# Patient Record
Sex: FEMALE | Race: WHITE | Employment: FULL TIME | URBAN - METROPOLITAN AREA
[De-identification: names, ages, dates, MRNs, and addresses within clinical notes are randomized per-mention and may not be internally consistent; named-entity substitution may affect disease eponyms.]

---

## 2023-08-18 ENCOUNTER — OFFICE VISIT (OUTPATIENT)
Age: 28
End: 2023-08-18

## 2023-08-18 VITALS — DIASTOLIC BLOOD PRESSURE: 60 MMHG | WEIGHT: 141 LBS | SYSTOLIC BLOOD PRESSURE: 108 MMHG

## 2023-08-18 DIAGNOSIS — Z31.69 ENCOUNTER FOR PRECONCEPTION CONSULTATION: ICD-10-CM

## 2023-08-18 DIAGNOSIS — N92.6 IRREGULAR PERIODS/MENSTRUAL CYCLES: ICD-10-CM

## 2023-08-18 DIAGNOSIS — Z01.419 WELL WOMAN EXAM: Primary | ICD-10-CM

## 2023-08-18 LAB
EST. AVERAGE GLUCOSE BLD GHB EST-MCNC: 123 MG/DL
FSH SERPL-ACNC: 4.9 MIU/ML
HBA1C MFR BLD: 5.9 % (ref 4–5.6)
LH SERPL-ACNC: 4.5 MIU/ML
PROLACTIN SERPL-MCNC: 4.9 NG/ML
T4 FREE SERPL-MCNC: 0.8 NG/DL (ref 0.8–1.5)
TSH SERPL DL<=0.05 MIU/L-ACNC: 3.36 UIU/ML (ref 0.36–3.74)

## 2023-08-18 RX ORDER — INSULIN GLARGINE 100 [IU]/ML
INJECTION, SOLUTION SUBCUTANEOUS
COMMUNITY
Start: 2023-03-06

## 2023-08-18 RX ORDER — INSULIN LISPRO 100 [IU]/ML
INJECTION, SOLUTION SUBCUTANEOUS
COMMUNITY
Start: 2023-03-06

## 2023-08-18 RX ORDER — INSULIN GLARGINE 100 [IU]/ML
INJECTION, SOLUTION SUBCUTANEOUS
COMMUNITY
Start: 2023-05-15

## 2023-08-18 RX ORDER — INSULIN LISPRO 100 [IU]/ML
INJECTION, SOLUTION INTRAVENOUS; SUBCUTANEOUS
COMMUNITY
Start: 2023-05-15

## 2023-08-18 NOTE — PROGRESS NOTES
Prolactin; Future  - T4, Free; Future  - TSH; Future  - Testosterone, free, total; Future  - 17-OH Progesterone, LC/MS; Future  - FSH & LH; Future  - Hemoglobin A1C  - DHEA-Sulfate  - Prolactin  - T4, Free  - TSH  - Testosterone, free, total  - 17-OH Progesterone, LC/MS  - FSH & LH       Well woman exam:  Normal gynecologic and breast exams. Healthy habits and lifestyle reviewed. Pap with HPV was performed today. Patient declines STD screening. Contraception and menstrual regulation - patient opts for none       Patient will follow up PRN or next annual exam. Will contact pt with labs       Coretta Mishra MD

## 2023-08-19 LAB
DHEA-S SERPL-MCNC: 273 UG/DL (ref 84.8–378)
TESTOST SERPL-MCNC: 27 NG/DL (ref 13–71)

## 2023-08-20 LAB — 17OHP SERPL-MCNC: 24 NG/DL

## 2023-08-23 LAB
., LABCORP: NORMAL
CYTOLOGIST CVX/VAG CYTO: NORMAL
CYTOLOGY CVX/VAG DOC CYTO: NORMAL
CYTOLOGY CVX/VAG DOC THIN PREP: NORMAL
DX ICD CODE: NORMAL
Lab: NORMAL
OTHER STN SPEC: NORMAL
STAT OF ADQ CVX/VAG CYTO-IMP: NORMAL

## 2023-08-27 LAB
TESTOST FREE SERPL-MCNC: 2.5 PG/ML (ref 0–4.2)
TESTOST SERPL-MCNC: 27 NG/DL (ref 13–71)

## 2024-04-22 NOTE — PROGRESS NOTES
FOLLOW UP VISIT    Karissa Wang is a 28 y.o.  presenting for a ultrasound and follow up visit. Her main concerns today include irregular periods.     Chief Complaint   Patient presents with    Ultrasound    Follow-up     Ultrasound today 04/24/2024:  TV ULTRASOUND--- INCREASED BOWEL GAS THE UTERUS IS ANTEVERTED, NORMAL IN SIZE AND ECHOGENICITY. THE ENDOMETRIUM MEASURES 6.4MM IN THICKNESS. THERE APPEARS TO BE INCREASE BLOOD FLOW SEEN WITHIN THEN ENDOMETRIAL LINING. RIGHT OVARY APPEARS WNL. LEFT OVARY APPEARS WNL. NO FREE FLUID IS SEEN IN THE CDS.    From last ov labs 08/18/2023;  FSH 4.9  LH 4.5  17OH Progesterone 24  Testosterone 27  Testosterone Free 2.5  TSH 3.36  T4 Free 0.8  DHEAS 273.0  A1C 5.9       Ob/Gyn Hx:  G0  LMP - Patient's last menstrual period was 02/19/2024.  Menses - irregular   Contraception - none.  Hx of STI - No    SA - Yes      Health Maintenance:  Last Pap: 08/18/2023 NILM    1. Have you been to the ER, urgent care clinic, or hospitalized since your last visit?No    2. Have you seen or consulted any other health care providers outside of the [facility] since your last visit? Endocrinology    Patient declines chaperone.    JH LAGUNAS RN

## 2024-04-24 ENCOUNTER — OFFICE VISIT (OUTPATIENT)
Age: 29
End: 2024-04-24
Payer: COMMERCIAL

## 2024-04-24 VITALS — DIASTOLIC BLOOD PRESSURE: 78 MMHG | WEIGHT: 149.2 LBS | SYSTOLIC BLOOD PRESSURE: 102 MMHG

## 2024-04-24 DIAGNOSIS — N92.6 IRREGULAR PERIODS/MENSTRUAL CYCLES: Primary | ICD-10-CM

## 2024-04-24 LAB
CREAT UR-MCNC: 33.6 MG/DL
MICROALBUMIN UR-MCNC: <0.5 MG/DL
MICROALBUMIN/CREAT UR-RTO: <15 MG/G (ref 0–30)

## 2024-04-24 PROCEDURE — G8427 DOCREV CUR MEDS BY ELIG CLIN: HCPCS | Performed by: OBSTETRICS & GYNECOLOGY

## 2024-04-24 PROCEDURE — 1036F TOBACCO NON-USER: CPT | Performed by: OBSTETRICS & GYNECOLOGY

## 2024-04-24 PROCEDURE — 99214 OFFICE O/P EST MOD 30 MIN: CPT | Performed by: OBSTETRICS & GYNECOLOGY

## 2024-04-24 PROCEDURE — G8421 BMI NOT CALCULATED: HCPCS | Performed by: OBSTETRICS & GYNECOLOGY

## 2024-04-24 NOTE — PROGRESS NOTES
Problem Visit    Karissa Wagn is a 28 y.o. presenting for problem visit. Her main concern today is irregular menstrual cycles. Most recent cycle was February. No cycle in March or April so far. At the end of last year she went about 4 months without a cycle.  This has been going on for years. She is . Type 1 DM was dx her senior year of high school. Follows with Dr. Betts at [facility]. She was worked up for PCOS at her last visit but never had an US until today. PCOS labs at last visit were normal.     Labs 08/18/2023:  FSH 4.9  LH 4.5  17OH Progesterone 24  Testosterone 27  Testosterone Free 2.5  TSH 3.36  T4 Free 0.8  DHEAS 273.0  A1C 5.9      Past Medical History:   Diagnosis Date    Type 1 diabetes mellitus (HCC)        History reviewed. No pertinent surgical history.    Family History   Problem Relation Age of Onset    Breast Cancer Maternal Grandmother        Social History     Socioeconomic History    Marital status:      Spouse name: Not on file    Number of children: Not on file    Years of education: Not on file    Highest education level: Not on file   Occupational History    Not on file   Tobacco Use    Smoking status: Never    Smokeless tobacco: Never   Vaping Use    Vaping Use: Never used   Substance and Sexual Activity    Alcohol use: Yes    Drug use: Never    Sexual activity: Yes     Partners: Male     Birth control/protection: None   Other Topics Concern    Not on file   Social History Narrative    Not on file     Social Determinants of Health     Financial Resource Strain: Not on file   Food Insecurity: Not on file   Transportation Needs: Not on file   Physical Activity: Not on file   Stress: Not on file   Social Connections: Not on file   Intimate Partner Violence: Not on file   Housing Stability: Not on file       Current Outpatient Medications   Medication Sig Dispense Refill    Prenatal MV-Min-Fe Fum-FA-DHA (PRENATAL 1 PO) Take by mouth      insulin lispro (HUMALOG) 100 UNIT/ML

## 2024-04-26 LAB — TSH SERPL DL<=0.05 MIU/L-ACNC: 3.02 UIU/ML (ref 0.45–4.5)

## 2024-11-19 ENCOUNTER — OFFICE VISIT (OUTPATIENT)
Age: 29
End: 2024-11-19
Payer: COMMERCIAL

## 2024-11-19 VITALS
WEIGHT: 157 LBS | OXYGEN SATURATION: 98 % | HEIGHT: 72 IN | BODY MASS INDEX: 21.26 KG/M2 | SYSTOLIC BLOOD PRESSURE: 108 MMHG | DIASTOLIC BLOOD PRESSURE: 64 MMHG

## 2024-11-19 DIAGNOSIS — E10.9 DM W/O COMPLICATION TYPE I (HCC): ICD-10-CM

## 2024-11-19 DIAGNOSIS — N92.6 IRREGULAR PERIODS/MENSTRUAL CYCLES: ICD-10-CM

## 2024-11-19 DIAGNOSIS — Z31.89 ENCOUNTER FOR FERTILITY PLANNING: Primary | ICD-10-CM

## 2024-11-19 PROCEDURE — 3046F HEMOGLOBIN A1C LEVEL >9.0%: CPT | Performed by: OBSTETRICS & GYNECOLOGY

## 2024-11-19 PROCEDURE — 2022F DILAT RTA XM EVC RTNOPTHY: CPT | Performed by: OBSTETRICS & GYNECOLOGY

## 2024-11-19 PROCEDURE — 99214 OFFICE O/P EST MOD 30 MIN: CPT | Performed by: OBSTETRICS & GYNECOLOGY

## 2024-11-19 PROCEDURE — G8420 CALC BMI NORM PARAMETERS: HCPCS | Performed by: OBSTETRICS & GYNECOLOGY

## 2024-11-19 PROCEDURE — G8427 DOCREV CUR MEDS BY ELIG CLIN: HCPCS | Performed by: OBSTETRICS & GYNECOLOGY

## 2024-11-19 PROCEDURE — G8484 FLU IMMUNIZE NO ADMIN: HCPCS | Performed by: OBSTETRICS & GYNECOLOGY

## 2024-11-19 PROCEDURE — 1036F TOBACCO NON-USER: CPT | Performed by: OBSTETRICS & GYNECOLOGY

## 2024-11-19 SDOH — ECONOMIC STABILITY: TRANSPORTATION INSECURITY
IN THE PAST 12 MONTHS, HAS LACK OF TRANSPORTATION KEPT YOU FROM MEETINGS, WORK, OR FROM GETTING THINGS NEEDED FOR DAILY LIVING?: NO

## 2024-11-19 SDOH — ECONOMIC STABILITY: FOOD INSECURITY: WITHIN THE PAST 12 MONTHS, YOU WORRIED THAT YOUR FOOD WOULD RUN OUT BEFORE YOU GOT MONEY TO BUY MORE.: NEVER TRUE

## 2024-11-19 SDOH — ECONOMIC STABILITY: FOOD INSECURITY: WITHIN THE PAST 12 MONTHS, THE FOOD YOU BOUGHT JUST DIDN'T LAST AND YOU DIDN'T HAVE MONEY TO GET MORE.: NEVER TRUE

## 2024-11-19 SDOH — ECONOMIC STABILITY: INCOME INSECURITY: HOW HARD IS IT FOR YOU TO PAY FOR THE VERY BASICS LIKE FOOD, HOUSING, MEDICAL CARE, AND HEATING?: NOT HARD AT ALL

## 2024-11-19 ASSESSMENT — PATIENT HEALTH QUESTIONNAIRE - PHQ9
SUM OF ALL RESPONSES TO PHQ QUESTIONS 1-9: 0
SUM OF ALL RESPONSES TO PHQ9 QUESTIONS 1 & 2: 0
1. LITTLE INTEREST OR PLEASURE IN DOING THINGS: NOT AT ALL
2. FEELING DOWN, DEPRESSED OR HOPELESS: NOT AT ALL
SUM OF ALL RESPONSES TO PHQ QUESTIONS 1-9: 0
2. FEELING DOWN, DEPRESSED OR HOPELESS: NOT AT ALL
SUM OF ALL RESPONSES TO PHQ9 QUESTIONS 1 & 2: 0
SUM OF ALL RESPONSES TO PHQ QUESTIONS 1-9: 0
SUM OF ALL RESPONSES TO PHQ QUESTIONS 1-9: 0
1. LITTLE INTEREST OR PLEASURE IN DOING THINGS: NOT AT ALL

## 2024-11-19 NOTE — PROGRESS NOTES
Karissa Wang is a 29 y.o. female presents for a problem visit.    Patient has been trying to conceive for a few months.    LMP: 9/15/2024  Birth Control: none.  Last Pap: normal obtained 1 year(s) ago.    The patient is reporting having: Irregular cycles. Her last cycle lasted for 2-3 days.      1. Have you been to the ER, urgent care clinic, or hospitalized since your last visit? No

## 2024-11-19 NOTE — PROGRESS NOTES
Problem Visit    Karissa Wang is a 29 y.o. G0 presenting for problem visit. Her main concern today is fertility. She has been trying to conceive from the last few months. Having timed intercourse and using OPK's. Has had evidence of ovulation.  She had type I DM that is well controlled. Menstrual cycles have been irregular. Last cycle was in September. Had a cycle in January and February then went six months without a cycle. Had a month long cycle in August with positive ovulation. September she had a two day cycle. No cycle in October. She had evidence of ovulation this month.       LMP: 9/15/2024  Birth Control: none.  Last Pap: NILM 8/18/24      Past Medical History:   Diagnosis Date    Type 1 diabetes mellitus (HCC)        History reviewed. No pertinent surgical history.    Family History   Problem Relation Age of Onset    Breast Cancer Maternal Grandmother        Social History     Socioeconomic History    Marital status:      Spouse name: Not on file    Number of children: Not on file    Years of education: Not on file    Highest education level: Not on file   Occupational History    Not on file   Tobacco Use    Smoking status: Never    Smokeless tobacco: Never   Vaping Use    Vaping status: Never Used   Substance and Sexual Activity    Alcohol use: Yes     Comment: occasional    Drug use: Never    Sexual activity: Yes     Partners: Male     Birth control/protection: None   Other Topics Concern    Not on file   Social History Narrative    Not on file     Social Determinants of Health     Financial Resource Strain: Not on file   Food Insecurity: Not on file (11/19/2024)   Transportation Needs: Not on file   Physical Activity: Not on file   Stress: Not on file   Social Connections: Not on file   Intimate Partner Violence: Not on file   Housing Stability: Unknown (11/19/2024)    Housing Stability Vital Sign     Unable to Pay for Housing in the Last Year: Not on file     Number of Times Moved in the

## 2024-12-06 DIAGNOSIS — Z34.90 PREGNANCY, UNSPECIFIED GESTATIONAL AGE: Primary | ICD-10-CM

## 2024-12-06 DIAGNOSIS — O24.019 TYPE 1 DIABETES MELLITUS DURING PREGNANCY, ANTEPARTUM: ICD-10-CM

## 2024-12-10 ENCOUNTER — LAB (OUTPATIENT)
Age: 29
End: 2024-12-10

## 2024-12-10 DIAGNOSIS — Z34.90 PREGNANCY, UNSPECIFIED GESTATIONAL AGE: ICD-10-CM

## 2024-12-10 DIAGNOSIS — O24.019 TYPE 1 DIABETES MELLITUS DURING PREGNANCY, ANTEPARTUM: ICD-10-CM

## 2024-12-10 LAB
ABO + RH BLD: NORMAL
BLOOD BANK CMNT PATIENT-IMP: NORMAL
BLOOD GROUP ANTIBODIES SERPL: NORMAL
C. TRACHOMATIS, EXTERNAL RESULT: NORMAL
HEP B, EXTERNAL RESULT: NEGATIVE
HEPATITIS C ANTIBODY, EXTERNAL RESULT: NEGATIVE
HIV, EXTERNAL RESULT: NONREACTIVE
N. GONORRHOEAE, EXTERNAL RESULT: NEGATIVE
RUBELLA TITER, EXTERNAL RESULT: NORMAL
SPECIMEN EXP DATE BLD: NORMAL
T. PALLIDUM (SYPHILIS) ANTIBODY, EXTERNAL RESULT: NEGATIVE

## 2024-12-11 LAB
BACTERIA SPEC CULT: NORMAL
SERVICE CMNT-IMP: NORMAL

## 2024-12-12 DIAGNOSIS — Z32.01 POSITIVE PREGNANCY TEST: Primary | ICD-10-CM

## 2024-12-12 LAB
ALBUMIN SERPL-MCNC: 4 G/DL (ref 3.5–5)
ALBUMIN/GLOB SERPL: 1.4 (ref 1.1–2.2)
ALP SERPL-CCNC: 30 U/L (ref 45–117)
ALT SERPL-CCNC: 26 U/L (ref 12–78)
ANION GAP SERPL CALC-SCNC: 8 MMOL/L (ref 2–12)
AST SERPL-CCNC: 13 U/L (ref 15–37)
BILIRUB SERPL-MCNC: 0.5 MG/DL (ref 0.2–1)
BUN SERPL-MCNC: 14 MG/DL (ref 6–20)
BUN/CREAT SERPL: 19 (ref 12–20)
CALCIUM SERPL-MCNC: 9.2 MG/DL (ref 8.5–10.1)
CHLORIDE SERPL-SCNC: 105 MMOL/L (ref 97–108)
CO2 SERPL-SCNC: 25 MMOL/L (ref 21–32)
CREAT SERPL-MCNC: 0.74 MG/DL (ref 0.55–1.02)
ERYTHROCYTE [DISTWIDTH] IN BLOOD BY AUTOMATED COUNT: 11.8 % (ref 11.5–14.5)
EST. AVERAGE GLUCOSE BLD GHB EST-MCNC: 126 MG/DL
GLOBULIN SER CALC-MCNC: 2.9 G/DL (ref 2–4)
GLUCOSE SERPL-MCNC: 124 MG/DL (ref 65–100)
HBA1C MFR BLD: 6 % (ref 4–5.6)
HBV SURFACE AG SER QL: <0.1 INDEX
HBV SURFACE AG SER QL: NEGATIVE
HCT VFR BLD AUTO: 38 % (ref 35–47)
HCV AB SER IA-ACNC: 0.15 INDEX
HCV AB SERPL QL IA: NONREACTIVE
HGB BLD-MCNC: 12.5 G/DL (ref 11.5–16)
HIV 1+2 AB+HIV1 P24 AG SERPL QL IA: NONREACTIVE
HIV 1/2 RESULT COMMENT: NORMAL
MCH RBC QN AUTO: 30.5 PG (ref 26–34)
MCHC RBC AUTO-ENTMCNC: 32.9 G/DL (ref 30–36.5)
MCV RBC AUTO: 92.7 FL (ref 80–99)
NRBC # BLD: 0 K/UL (ref 0–0.01)
NRBC BLD-RTO: 0 PER 100 WBC
PLATELET # BLD AUTO: 220 K/UL (ref 150–400)
PMV BLD AUTO: 11.5 FL (ref 8.9–12.9)
POTASSIUM SERPL-SCNC: 3.8 MMOL/L (ref 3.5–5.1)
PROT SERPL-MCNC: 6.9 G/DL (ref 6.4–8.2)
RBC # BLD AUTO: 4.1 M/UL (ref 3.8–5.2)
RUBV IGG SERPL IA-ACNC: NORMAL IU/ML
SODIUM SERPL-SCNC: 138 MMOL/L (ref 136–145)
T PALLIDUM AB SER QL IA: NON REACTIVE
VZV IGG SER IA-ACNC: REACTIVE
WBC # BLD AUTO: 7.7 K/UL (ref 3.6–11)

## 2024-12-12 NOTE — PROGRESS NOTES
Patient to be seen in office for viability ultrasound and initial prenatal visit. Ultrasound order pended for signing.

## 2024-12-13 LAB
C TRACH RRNA SPEC QL NAA+PROBE: NEGATIVE
N GONORRHOEA RRNA SPEC QL NAA+PROBE: NEGATIVE
SPECIMEN SOURCE: NORMAL
T VAGINALIS RRNA SPEC QL NAA+PROBE: NEGATIVE
TSH SERPL DL<=0.05 MIU/L-ACNC: 3.59 UIU/ML (ref 0.45–4.5)

## 2024-12-16 LAB
HGB A MFR BLD: 97.5 % (ref 96.4–98.8)
HGB A2 MFR BLD COLUMN CHROM: 2.5 % (ref 1.8–3.2)
HGB F MFR BLD: 0 % (ref 0–2)
HGB FRACT BLD-IMP: NORMAL
HGB S MFR BLD: 0 %

## 2024-12-19 ENCOUNTER — INITIAL PRENATAL (OUTPATIENT)
Age: 29
End: 2024-12-19

## 2024-12-19 VITALS
TEMPERATURE: 98 F | WEIGHT: 153 LBS | HEART RATE: 77 BPM | SYSTOLIC BLOOD PRESSURE: 118 MMHG | HEIGHT: 72 IN | OXYGEN SATURATION: 99 % | DIASTOLIC BLOOD PRESSURE: 79 MMHG | BODY MASS INDEX: 20.72 KG/M2

## 2024-12-19 DIAGNOSIS — Z34.81 ENCOUNTER FOR SUPERVISION OF OTHER NORMAL PREGNANCY, FIRST TRIMESTER: ICD-10-CM

## 2024-12-19 DIAGNOSIS — Z34.90 PREGNANCY, UNSPECIFIED GESTATIONAL AGE: Primary | ICD-10-CM

## 2024-12-19 DIAGNOSIS — Z31.430 ENCOUNTER OF FEMALE FOR TESTING FOR GENETIC DISEASE CARRIER STATUS FOR PROCREATIVE MANAGEMENT: ICD-10-CM

## 2024-12-19 PROCEDURE — 0500F INITIAL PRENATAL CARE VISIT: CPT | Performed by: OBSTETRICS & GYNECOLOGY

## 2024-12-19 RX ORDER — FOLIC ACID 0.4 MG
400 TABLET ORAL DAILY
Qty: 30 TABLET | Refills: 3 | Status: SHIPPED | OUTPATIENT
Start: 2024-12-19

## 2024-12-19 NOTE — PROGRESS NOTES
New pregnancy visit:    Patient's last menstrual period was 09/15/2024 (exact date).     Last Pap: 2023 NILM     LMP history:  The date of her LMP is  certain.  Her last menstrual period was normal and lasted for 4 to 5 days.  She was not on the pill at conception.      Based on her LMP, her EDC is 2025 and her EGA is 13 weeks,4 days. Her menstrual cycles are regular and occur approximately every 28 days and range from 3 to 5 days. The last menses lasted  the usual number of days.      Ultrasound data:  She had an ultrasound done by the ultrasound tech today which revealed a viable cobos pregnancy with a gestational age of 9 weeks and 5 days giving an EDC of 2025.     Pregnancy symptoms:     Since her LMP she has experienced urinary frequency, breast tenderness, and nausea.   She has not been vomiting over the last few weeks.  Associated signs and symptoms which she denies: dysuria, discharge, vaginal bleeding.     She states she has gained weight:  Approximately 5 pounds over the last few weeks.     Relevant past pregnancy history:              She has the following pregnancy history: G1               She has no history of  delivery.     _ _ _ _ _ _ _ _ _ _ _ _ _ _ _ _ _ _ _ _ _ _ _ _ _ _ _ _ _ _ _     Substance history: negative for alcohol, tobacco and street drugs.           Positive for nothing.  Exposure history:   There is/are no indoor cat/s in the home.  The patient was instructed to not change the cat litter.   She admits close contact with children on a regular basis.   She has had chicken pox or the vaccine in the past.   Patient denies issues with domestic violence.     Genetic Screening/Teratology Counseling: (Includes patient, baby's father, or anyone in either family with:)  1.  Patient's age >/= 35 at EDC?--no  2.  Thalassemia (Malawian, Greek, Mediterranean, or  background): MCV<80?--no.     3.  Neural tube defect (meningomyelocele, spina bifida, anencephaly)?--no.

## 2024-12-27 RX ORDER — ASPIRIN 81 MG/1
81 TABLET ORAL DAILY
Qty: 30 TABLET | Refills: 6 | Status: SHIPPED | OUTPATIENT
Start: 2024-12-27

## 2024-12-30 LAB
Lab: NORMAL
NTRA 22Q11.2 DELETION SYNDROME POPULATION-BASED RISK TEXT: NORMAL
NTRA 22Q11.2 DELETION SYNDROME RESULT TEXT: NORMAL
NTRA 22Q11.2 DELETION SYNDROME RISK SCORE TEXT: NORMAL
NTRA FETAL FRACTION: NORMAL
NTRA FETAL RHD SUMMARY: NORMAL
NTRA GENDER OF FETUS: NORMAL
NTRA MONOSOMY X AGE-BASED RISK TEXT: NORMAL
NTRA MONOSOMY X RESULT TEXT: NORMAL
NTRA MONOSOMY X RISK SCORE TEXT: NORMAL
NTRA TRIPLOIDY RESULT TEXT: NORMAL
NTRA TRISOMY 13 AGE-BASED RISK TEXT: NORMAL
NTRA TRISOMY 13 RESULT TEXT: NORMAL
NTRA TRISOMY 13 RISK SCORE TEXT: NORMAL
NTRA TRISOMY 18 AGE-BASED RISK TEXT: NORMAL
NTRA TRISOMY 18 RESULT TEXT: NORMAL
NTRA TRISOMY 18 RISK SCORE TEXT: NORMAL
NTRA TRISOMY 21 AGE-BASED RISK TEXT: NORMAL
NTRA TRISOMY 21 RESULT TEXT: NORMAL
NTRA TRISOMY 21 RISK SCORE TEXT: NORMAL

## 2025-01-06 ENCOUNTER — TELEPHONE (OUTPATIENT)
Age: 30
End: 2025-01-06

## 2025-01-06 LAB
Lab: NEGATIVE
Lab: NORMAL
NTRA ALPHA-THALASSEMIA: NEGATIVE
NTRA BETA-HEMOGLOBINOPATHIES: NEGATIVE
NTRA CANAVAN DISEASE: NEGATIVE
NTRA CYSTIC FIBROSIS: NEGATIVE
NTRA DUCHENNE/BECKER MUSCULAR DYSTROPHY: NEGATIVE
NTRA FAMILIAL DYSAUTONOMIA: NEGATIVE
NTRA FRAGILE X SYNDROME: NEGATIVE
NTRA GALACTOSEMIA: NEGATIVE
NTRA GAUCHER DISEASE: NEGATIVE
NTRA MEDIUM CHAIN ACYL-COA DEHYDROGENASE DEFICIENCY: NEGATIVE
NTRA POLYCYSTIC KIDNEY DISEASE, AUTOSOMAL RECESSIVE: NEGATIVE
NTRA SMITH-LEMLI-OPITZ SYNDROME: NEGATIVE
NTRA SPINAL MUSCULAR ATROPHY: NEGATIVE
NTRA TAY-SACHS DISEASE: NEGATIVE

## 2025-01-06 NOTE — TELEPHONE ENCOUNTER
Spoke w/ patient and confirmed name and      Rescheduled appointment from  to 1/15 at [facility]     Patient confirmed change     Told patient to plan for about an hour scan  Reminded her that she may bring two guest over the age of 12.

## 2025-01-13 SDOH — ECONOMIC STABILITY: INCOME INSECURITY: IN THE LAST 12 MONTHS, WAS THERE A TIME WHEN YOU WERE NOT ABLE TO PAY THE MORTGAGE OR RENT ON TIME?: NO

## 2025-01-13 SDOH — ECONOMIC STABILITY: FOOD INSECURITY: WITHIN THE PAST 12 MONTHS, THE FOOD YOU BOUGHT JUST DIDN'T LAST AND YOU DIDN'T HAVE MONEY TO GET MORE.: NEVER TRUE

## 2025-01-13 SDOH — ECONOMIC STABILITY: TRANSPORTATION INSECURITY
IN THE PAST 12 MONTHS, HAS THE LACK OF TRANSPORTATION KEPT YOU FROM MEDICAL APPOINTMENTS OR FROM GETTING MEDICATIONS?: NO

## 2025-01-13 SDOH — ECONOMIC STABILITY: FOOD INSECURITY: WITHIN THE PAST 12 MONTHS, YOU WORRIED THAT YOUR FOOD WOULD RUN OUT BEFORE YOU GOT MONEY TO BUY MORE.: NEVER TRUE

## 2025-01-15 ENCOUNTER — ROUTINE PRENATAL (OUTPATIENT)
Age: 30
End: 2025-01-15

## 2025-01-15 ENCOUNTER — ROUTINE PRENATAL (OUTPATIENT)
Age: 30
End: 2025-01-15
Payer: COMMERCIAL

## 2025-01-15 VITALS
OXYGEN SATURATION: 97 % | SYSTOLIC BLOOD PRESSURE: 115 MMHG | HEIGHT: 72 IN | HEART RATE: 78 BPM | DIASTOLIC BLOOD PRESSURE: 77 MMHG | TEMPERATURE: 98.1 F | WEIGHT: 161 LBS | RESPIRATION RATE: 16 BRPM | BODY MASS INDEX: 21.81 KG/M2

## 2025-01-15 DIAGNOSIS — O24.319 PREGESTATIONAL DIABETES MELLITUS, MODIFIED WHITE CLASS B: ICD-10-CM

## 2025-01-15 DIAGNOSIS — Z34.90 PREGNANCY, UNSPECIFIED GESTATIONAL AGE: Primary | ICD-10-CM

## 2025-01-15 DIAGNOSIS — Z3A.13 13 WEEKS GESTATION OF PREGNANCY: Primary | ICD-10-CM

## 2025-01-15 PROCEDURE — 1036F TOBACCO NON-USER: CPT | Performed by: STUDENT IN AN ORGANIZED HEALTH CARE EDUCATION/TRAINING PROGRAM

## 2025-01-15 PROCEDURE — 3046F HEMOGLOBIN A1C LEVEL >9.0%: CPT | Performed by: STUDENT IN AN ORGANIZED HEALTH CARE EDUCATION/TRAINING PROGRAM

## 2025-01-15 PROCEDURE — 2022F DILAT RTA XM EVC RTNOPTHY: CPT | Performed by: STUDENT IN AN ORGANIZED HEALTH CARE EDUCATION/TRAINING PROGRAM

## 2025-01-15 PROCEDURE — 76801 OB US < 14 WKS SINGLE FETUS: CPT | Performed by: STUDENT IN AN ORGANIZED HEALTH CARE EDUCATION/TRAINING PROGRAM

## 2025-01-15 PROCEDURE — 99204 OFFICE O/P NEW MOD 45 MIN: CPT | Performed by: STUDENT IN AN ORGANIZED HEALTH CARE EDUCATION/TRAINING PROGRAM

## 2025-01-15 PROCEDURE — 76813 OB US NUCHAL MEAS 1 GEST: CPT | Performed by: STUDENT IN AN ORGANIZED HEALTH CARE EDUCATION/TRAINING PROGRAM

## 2025-01-15 PROCEDURE — G8420 CALC BMI NORM PARAMETERS: HCPCS | Performed by: STUDENT IN AN ORGANIZED HEALTH CARE EDUCATION/TRAINING PROGRAM

## 2025-01-15 PROCEDURE — 0502F SUBSEQUENT PRENATAL CARE: CPT | Performed by: OBSTETRICS & GYNECOLOGY

## 2025-01-15 PROCEDURE — G8427 DOCREV CUR MEDS BY ELIG CLIN: HCPCS | Performed by: STUDENT IN AN ORGANIZED HEALTH CARE EDUCATION/TRAINING PROGRAM

## 2025-01-15 ASSESSMENT — PATIENT HEALTH QUESTIONNAIRE - PHQ9
1. LITTLE INTEREST OR PLEASURE IN DOING THINGS: NOT AT ALL
SUM OF ALL RESPONSES TO PHQ9 QUESTIONS 1 & 2: 0
SUM OF ALL RESPONSES TO PHQ QUESTIONS 1-9: 0
2. FEELING DOWN, DEPRESSED OR HOPELESS: NOT AT ALL

## 2025-01-15 NOTE — PROCEDURES
PATIENT: EVA MALLORY.   -  : 1995   -  DOS:01/15/2025   -  INTERPRETING PROVIDER:Sadaf Eagle,   Indication  ========    Type 1 DM (on insulin)    Method  ======    Transabdominal ultrasound examination, View: Good view    Pregnancy  =========    Shetty pregnancy. Number of fetuses: 1    Dating  ======    Previous Ultrasound on: 2024  Type of prior assessment: GA  GA at prior assessment date 9 w + 5 d  GA by previous U/S 13 w + 4 d  JONG by previous Ultrasound: 2025  Ultrasound examination on: 1/15/2025  GA by U/S based upon: CRL  GA by U/S 13 w + 5 d  JONG by U/S: 2025  Assigned: based on ultrasound (GA), selected on 01/15/2025  Assigned GA 13 w + 4 d  Assigned JONG: 2025    General Evaluation  ==============    Cardiac activity present  Placenta: posterior, fundal  Amniotic fluid: normal amount, normal amount    Fetal Biometry  ============    Standard   bpm  <1% Nicolaides  CRL 76.2 mm 13w 5d 56% Hadlock  NT 1.60 mm  Extended  Nasal bone 3.2 mm  Nasal bone: present  MVP 4.8 cm    Fetal Anatomy  ===========    The following structures appear normal:  Stomach. Bladder.    The following structures were visualized:  Cranium: Midline falx  Choroid plexus, Midline falx  Choroid plexus. Face: Profile, Profile. Abdominal wall: Intact, . Arms. Legs.    Maternal Structures  ===============    Ovaries / Tubes / Adnexa  Rt ovary: Not visualized  Rt ovary details: right adnexa appears normal  Lt ovary: Not visualized  Lt ovary details: left adnexa appears normal    Findings  =======    Intrauterine Shetty pregnancy at 13w 4d by clinical dates.  Cardiac activity is present.  Due to early gestation, limited anatomy visualized is stated above.  Right ovary is Not visualized.  Left ovary is Not visualized.    The ultrasound findings as listed above and diagnostic limitations of ultrasound imaging, including inability to exclude all anomalies, have been reviewed with the patient.

## 2025-01-15 NOTE — PROGRESS NOTES
Patient was seen 1/15/2025      Please look under media to view full consult and ultrasound report in ViewPoint.         Sadaf Eagle MD   Maternal Fetal Medicine

## 2025-01-15 NOTE — PROGRESS NOTES
Karissa Wang is 13w4d   Doing well  Denies LOF, bleeding/spotting, cramping, headache, blurred vision, edema, or RUQ pain.     Saw endocrine and MFM today!

## 2025-01-15 NOTE — PROGRESS NOTES
No chief complaint on file.       LMP 09/15/2024 (Exact Date)     Pain Scale: /10  Pain Location:      Current Outpatient Medications on File Prior to Visit   Medication Sig Dispense Refill    aspirin (ASPIRIN 81) 81 MG EC tablet Take 1 tablet by mouth daily Start at 12 weeks 30 tablet 6    folic acid (V-R FOLIC ACID) 400 MCG tablet Take 1 tablet by mouth daily 30 tablet 3    Prenatal MV-Min-Fe Fum-FA-DHA (PRENATAL 1 PO) Take by mouth      insulin lispro (HUMALOG) 100 UNIT/ML SOLN injection vial 5 Units      insulin glargine (LANTUS SOLOSTAR) 100 UNIT/ML injection pen INJECT UP TO 10 UNITS ONCE DAILY      blood glucose test strips (ASCENSIA AUTODISC VI;ONE TOUCH ULTRA TEST VI) strip Use as directed to check blood sugar four times a day. Either Accu-chek Kacie or One Touch Ultra per pt preference       No current facility-administered medications on file prior to visit.       \"Have you been to the ER, urgent care clinic since your last visit?  Hospitalized since your last visit?\"    NO    “Have you seen or consulted any other health care providers outside of [facility] since your last visit?”    YES - When: approximately   ago.  Where and Why: [facility].

## 2025-01-15 NOTE — PROGRESS NOTES
Patient here for return OB visit at 13w4d. She reports no concerns.  Discussed blood sugars in detail.  She is following with her endocrinologist virtually through [facility].  Blood sugars are being reviewed weekly through endocrinology.  She is on maternal-fetal medicine today.  Dr. Eagle.    She denies vaginal bleeding, loss of fluid, abnormal vaginal discharge, UTI symptoms, cramping, or contractions.       /77 (Site: Left Upper Arm, Position: Sitting)   Pulse 78   Temp 98.1 °F (36.7 °C) (Oral)   Resp 16   Ht 1.829 m (6')   Wt 73 kg (161 lb)   LMP 09/15/2024 (Exact Date)   SpO2 97%   BMI 21.84 kg/m²          Prenatal Fetal Information  Fetal HR: 145  Movement: Absent      Patient Active Problem List    Diagnosis Date Noted    Pregestational diabetes mellitus, modified White class B 01/15/2025    13 weeks gestation of pregnancy 01/15/2025       Return in about 4 weeks (around 2/12/2025).    Coretta Wilks MD

## 2025-02-11 NOTE — PROGRESS NOTES
Patient here for return OB visit at 17w4d.    MSAFP today.    Discussed blood sugars in detail. Feeling down and a little bummed. Reassurance given. Weight discussed. Is not moving quiet as much as she was previously.     She denies vaginal bleeding, loss of fluid, abnormal vaginal discharge, UTI symptoms, cramping, or contractions.       /80 (Site: Right Upper Arm, Position: Sitting)   Pulse 88   Temp 98 °F (36.7 °C) (Oral)   Resp 16   Ht 1.829 m (6')   Wt 80.7 kg (178 lb)   LMP 09/15/2024 (Exact Date)   SpO2 99%   BMI 24.14 kg/m²      Patient Active Problem List    Diagnosis Date Noted    Pregnancy 2025     Primary Provider: Efe     EDC by: by 1st TM US not consistent with LMP  Social:  Mode   IOB labs: Hep B neg.Hep C neg. RPR neg. HIV neg. Varicella/Rubella immune O POSITIVE  . Normal hemoglobin electrophoresis.    Genetic Screening:Panoroma low risk  Horizon    Anatomy: with MFM on 3/5/25  3rd TM labs: GTT NA Type I DM  Hgb___ Plts___  Vaccines:   Flu:__ TDAP:__ RSV ___ 32-36 weeks September-January   Rhogam: O POSITIVE     GBS:    Pain management in labor :   -Epidural  -Lamaze   -Hydrotherapy        Postpartum  -Breast/Bottle   -Pap: 23 NILM      Problem List:  1) Type I DM  -A1C 6.0  -does not have a CGM  -follows with endocrinology at [facility] (Dr Betts)   -Lanus 18 units   - lispro ranges from 6-7 units per meal           Pregestational diabetes mellitus, modified White class B 01/15/2025       Return in about 4 weeks (around 3/12/2025).    Coretta Wilks MD

## 2025-02-12 ENCOUNTER — ROUTINE PRENATAL (OUTPATIENT)
Age: 30
End: 2025-02-12

## 2025-02-12 VITALS
WEIGHT: 178 LBS | TEMPERATURE: 98 F | OXYGEN SATURATION: 99 % | HEIGHT: 72 IN | HEART RATE: 88 BPM | DIASTOLIC BLOOD PRESSURE: 80 MMHG | BODY MASS INDEX: 24.11 KG/M2 | SYSTOLIC BLOOD PRESSURE: 125 MMHG | RESPIRATION RATE: 16 BRPM

## 2025-02-12 DIAGNOSIS — Z34.90 PREGNANCY, UNSPECIFIED GESTATIONAL AGE: Primary | ICD-10-CM

## 2025-02-12 PROBLEM — Z3A.13 13 WEEKS GESTATION OF PREGNANCY: Status: RESOLVED | Noted: 2025-01-15 | Resolved: 2025-02-12

## 2025-02-12 LAB
EST. AVERAGE GLUCOSE BLD GHB EST-MCNC: 94 MG/DL
HBA1C MFR BLD: 4.9 % (ref 4–5.6)
T4 FREE SERPL-MCNC: 0.8 NG/DL (ref 0.8–1.5)
TSH SERPL DL<=0.05 MIU/L-ACNC: 2.5 UIU/ML (ref 0.36–3.74)

## 2025-02-12 PROCEDURE — 0502F SUBSEQUENT PRENATAL CARE: CPT | Performed by: OBSTETRICS & GYNECOLOGY

## 2025-02-12 NOTE — PROGRESS NOTES
Karissa Wang is 17w4d   Doing well  Denies LOF, bleeding/spotting, cramping, headache, blurred vision, edema, or RUQ pain.     Reports increased fatigue    Desires MSAFP

## 2025-02-15 LAB
AFP INTERP SERPL-IMP: NORMAL
AFP MOM SERPL: 0.93
AFP SERPL-MCNC: 44.8 NG/ML
AGE AT DELIVERY: 29 YR
AGE OF EGG DONOR: NORMAL
AGE OF EGG DONOR: NORMAL
COMMENT: NORMAL
DONOR EGG?: NO
DONOR EGG?: NORMAL
FAMILY HISTORY NTD: NORMAL
FHX NTD (Y OR N): NORMAL
GA METHOD: NORMAL
GA: 21.4 WEEKS
IDDM PATIENT QL: YES
INSULIN DEP. DIABETIC: NORMAL
Lab: 178
Lab: NORMAL
Lab: NORMAL
MAT SCN FOR FETAL ABNORMALITIES SERPL: NORMAL
MULTIPLE PREGNANCY: NO
NEURAL TUBE DEFECT RISK FETUS: 4778
NUMBER OF FETUSES: YES
OTHER INDICATIONS: NO
OTHER INDICATIONS: NORMAL
PREVIOUSLY ELEVATED AFP (Y OR N): 17
PREVIOUSLY ELEVATED AFP (Y OR N): NO
PRIOR 1ST TRIM TESTING ?: NO
PRIOR 2ND TRIM TESTING ?: NO
PRIOR DS/NTD SCREEN CURRENT PREGNANCY?: NO
PRIOR DS/NTD SCREEN CURRENT PREGNANCY?: NORMAL
PRIOR FIRST TRIMESTER TESTING (Y OR N ): NORMAL
PRIOR PREGNANCY WITH DOWN SYNDROME (Y OR N): 1
PRIOR PREGNANCY WITH DOWN SYNDROME (Y OR N): NO
TYPE OF EGG DONOR: NORMAL
TYPE OF EGG DONOR: NORMAL

## 2025-02-26 ENCOUNTER — TELEPHONE (OUTPATIENT)
Age: 30
End: 2025-02-26

## 2025-03-05 DIAGNOSIS — Z34.90 PREGNANCY, UNSPECIFIED GESTATIONAL AGE: Primary | ICD-10-CM

## 2025-03-06 ENCOUNTER — ROUTINE PRENATAL (OUTPATIENT)
Age: 30
End: 2025-03-06

## 2025-03-06 VITALS — DIASTOLIC BLOOD PRESSURE: 70 MMHG | HEART RATE: 74 BPM | SYSTOLIC BLOOD PRESSURE: 120 MMHG

## 2025-03-06 DIAGNOSIS — Z34.90 PREGNANCY, UNSPECIFIED GESTATIONAL AGE: ICD-10-CM

## 2025-03-06 NOTE — PROCEDURES
PATIENT: EVA MALLORY.   -  : 1995   -  DOS:2025   -  INTERPRETING PROVIDER:Brodie Bach,   Indication  ========    Type 1 DM (on insulin)    Method  ======    Transabdominal ultrasound examination. View: suboptimal due to unfavorable fetal position    Dating  ======    Previous Ultrasound on: 2024  Type of prior assessment: GA  GA at prior assessment date 9 w + 5 d  GA by previous U/S 20 w + 5 d  JONG by previous Ultrasound: 2025  Ultrasound examination on: 3/6/2025  GA by U/S based upon: AC, BPD, Femur, HC  GA by U/S 20 w + 6 d  JONG by U/S: 2025  Assigned: based on ultrasound (GA), selected on 01/15/2025  Assigned GA 20 w + 5 d  Assigned JONG: 2025    Fetal Growth Overview  =================    Exam date        GA              BPD (mm)          HC (mm)              AC (mm)              FL (mm)             HL (mm)             EFW (g)  2025          20w 5d        50.5    72%        178.2     23%        158.3    52%        33.6     35%        32.5    56%        374     46%    Fetal Biometry  ============    Standard  BPD 50.5 mm 21w 2d 72% Hadlock  OFD 61.3 mm 21w 1d 63% Akiko  .2 mm 20w 2d 23% Hadlock  Cerebellum tr 20.5 mm 19w 4d 27% Hill  Nuchal fold 3.8 mm  .3 mm 21w 0d 52% Hadlock  Femur 33.6 mm 20w 4d 35% Hadlock  Humerus 32.5 mm 21w 0d 56% Akiko   g 20w 4d 46% Hadlock  EFW (lb) 0 lb  EFW (oz) 13 oz  EFW by: Hadlock (BPD-HC-AC-FL)  Extended   6.1 mm  CM 4.9 mm  41% Nicolaides  Head / Face / Neck  Nasal bone: NOT VISUALIZED  Other Structures   bpm    General Evaluation  ==============    Cardiac activity present.  bpm. Fetal movements: visualized. Presentation: Cephalic  Placenta: Placental site: posterior, appropriate distance from the internal os. Placental edge-to-cervical os distance 8.7 cm  Umbilical cord: Cord vessels: 3 vessel cord. Insertion site: central  Amniotic fluid: Amount of AF: normal    Fetal

## 2025-03-11 ENCOUNTER — CLINICAL DOCUMENTATION (OUTPATIENT)
Age: 30
End: 2025-03-11

## 2025-03-11 ENCOUNTER — NURSE ONLY (OUTPATIENT)
Age: 30
End: 2025-03-11

## 2025-03-14 ENCOUNTER — ROUTINE PRENATAL (OUTPATIENT)
Age: 30
End: 2025-03-14

## 2025-03-14 VITALS
SYSTOLIC BLOOD PRESSURE: 109 MMHG | DIASTOLIC BLOOD PRESSURE: 62 MMHG | WEIGHT: 179 LBS | RESPIRATION RATE: 16 BRPM | TEMPERATURE: 97.5 F | OXYGEN SATURATION: 98 % | HEART RATE: 71 BPM | HEIGHT: 72 IN | BODY MASS INDEX: 24.24 KG/M2

## 2025-03-14 DIAGNOSIS — Z34.90 PREGNANCY, UNSPECIFIED GESTATIONAL AGE: Primary | ICD-10-CM

## 2025-03-14 PROCEDURE — 0502F SUBSEQUENT PRENATAL CARE: CPT | Performed by: OBSTETRICS & GYNECOLOGY

## 2025-03-14 NOTE — PROGRESS NOTES
Karissa Wang is 21w6d   Doing well  Unsure if she has felt fetal movement yet   Denies LOF, bleeding/spotting, cramping, headache, blurred vision, edema, or RUQ pain.

## 2025-03-14 NOTE — PROGRESS NOTES
Patient here for return OB visit at 21w6d.   Discussed blood sugars in detail.   Discussed possibility of NPH.      She some fetal movement.   She denies vaginal bleeding, loss of fluid, abnormal vaginal discharge, UTI symptoms, cramping, or contractions.       /62 (BP Site: Right Upper Arm, Patient Position: Sitting)   Pulse 71   Temp 97.5 °F (36.4 °C) (Oral)   Resp 16   Ht 1.829 m (6')   Wt 81.2 kg (179 lb)   LMP 09/15/2024 (Exact Date)   SpO2 98%   BMI 24.28 kg/m²          Patient Active Problem List    Diagnosis Date Noted    Pregnancy 2025     Primary Provider: Efe     EDC by: by 1st TM US not consistent with LMP  Social:  Mode   IOB labs: Hep B neg.Hep C neg. RPR neg. HIV neg. Varicella/Rubella immune O POSITIVE  . Normal hemoglobin electrophoresis.    Genetic Screening:Panoroma low risk  Horizon    Anatomy: with MFM on 3/5/25  3rd TM labs: GTT NA Type I DM  Hgb___ Plts___  Vaccines:   Flu:__ TDAP:__ RSV ___ 32-36 weeks September-January   Rhogam: O POSITIVE     GBS:    Pain management in labor :   -Epidural  -Lamaze   -Hydrotherapy        Postpartum  -Breast/Bottle   -Pap: 23 NILM      Problem List:  1) Type I DM  -A1C 6.0  -does not have a CGM  -follows with endocrinology at [facility] (Dr Betts)   -Lanus 18 units   - lispro ranges from 6-7 units per meal           Pregestational diabetes mellitus, modified White class B 01/15/2025       Return in about 4 weeks (around 2025).    Coretta Wilks MD

## 2025-04-03 ENCOUNTER — ROUTINE PRENATAL (OUTPATIENT)
Age: 30
End: 2025-04-03

## 2025-04-03 VITALS — DIASTOLIC BLOOD PRESSURE: 69 MMHG | SYSTOLIC BLOOD PRESSURE: 107 MMHG | HEART RATE: 84 BPM

## 2025-04-03 DIAGNOSIS — Z34.90 PREGNANCY, UNSPECIFIED GESTATIONAL AGE: ICD-10-CM

## 2025-04-03 DIAGNOSIS — O26.90 PREGNANCY COMPLICATION, ANTEPARTUM: Primary | ICD-10-CM

## 2025-04-03 NOTE — PROCEDURES
PATIENT: EVA MALLORY.   -  : 1995   -  DOS:2025   -  INTERPRETING PROVIDER:Sadaf Eagle,   Indication  ========    Type 1 DM (on insulin)    Method  ======    Transabdominal ultrasound examination. View: Sufficient    Pregnancy  =========    Shetty pregnancy. Number of fetuses: 1    Dating  ======    Previous Ultrasound on: 2024  Type of prior assessment: GA  GA at prior assessment date 9 w + 5 d  GA by previous U/S 24 w + 5 d  JONG by previous Ultrasound: 2025  Ultrasound examination on: 4/3/2025  GA by U/S based upon: AC, BPD, Femur, HC  GA by U/S 25 w + 2 d  JONG by U/S: 7/15/2025  Assigned: based on ultrasound (GA), selected on 01/15/2025  Assigned GA 24 w + 5 d  Assigned JONG: 2025    Fetal Biometry  ============    Standard  BPD 63.3 mm 25w 4d 75% Hadlock  OFD 80.7 mm 26w 2d 92% Akiko  .5 mm 25w 0d 39% Hadlock  .7 mm 25w 2d 61% Hadlock  Femur 45.2 mm 25w 0d 44% Hadlock   g 25w 0d 61% Hadlock  EFW (lb) 1 lb  EFW (oz) 11 oz  EFW by: Hadlock (BPD-HC-AC-FL)  Extended  Nasal bone 7.1 mm  Head / Face / Neck  Nasal bone: present  Other Structures   bpm    General Evaluation  ==============    Cardiac activity present.  bpm. Fetal movements: visualized. Presentation: cephalic  Placenta: Placental site: posterior, appropriate distance from the internal os  Umbilical cord: Cord vessels: 3 vessel cord. Insertion site: central  Amniotic fluid: Amount of AF: normal. MVP 4.2 cm. GREGORIO 13.3 cm. Q1 4.2 cm, Q2 3.5 cm, Q3 2.6 cm, Q4 2.9 cm    Fetal Anatomy  ===========    Cavum septi pellucidi: normal  Lips: normal  Profile: normal  Nose: normal  Face  Coronal face: normal  Nasal bone: present  Palate: normal  Maxilla: normal  Mandible: normal  Orbits: normal  Stomach: normal  Kidneys: normal  Bladder: normal  Wants to know fetal sex: yes    Findings  =======    Intrauterine Shetty pregnancy at 24w 5d by clinical dates.  EFW is 779 g at 61%, abdominal

## 2025-04-03 NOTE — PROGRESS NOTES
Patient was seen 4/3/2025      Please look under media to view full consult and ultrasound report in ViewPoint.         Sadaf Eagle MD   Maternal Fetal Medicine

## 2025-04-15 ENCOUNTER — ROUTINE PRENATAL (OUTPATIENT)
Age: 30
End: 2025-04-15

## 2025-04-15 VITALS
BODY MASS INDEX: 25.06 KG/M2 | DIASTOLIC BLOOD PRESSURE: 68 MMHG | OXYGEN SATURATION: 98 % | HEIGHT: 72 IN | TEMPERATURE: 98.2 F | WEIGHT: 185 LBS | SYSTOLIC BLOOD PRESSURE: 120 MMHG | HEART RATE: 87 BPM | RESPIRATION RATE: 16 BRPM

## 2025-04-15 DIAGNOSIS — Z34.90 PREGNANCY, UNSPECIFIED GESTATIONAL AGE: Primary | ICD-10-CM

## 2025-04-15 LAB
BLOOD BANK CMNT PATIENT-IMP: NORMAL
BLOOD GROUP ANTIBODIES SERPL: NORMAL

## 2025-04-15 PROCEDURE — 0502F SUBSEQUENT PRENATAL CARE: CPT | Performed by: OBSTETRICS & GYNECOLOGY

## 2025-04-15 RX ORDER — FERROUS SULFATE 325(65) MG
325 TABLET, DELAYED RELEASE (ENTERIC COATED) ORAL
Qty: 30 TABLET | Refills: 11 | Status: SHIPPED | OUTPATIENT
Start: 2025-04-15 | End: 2025-04-15 | Stop reason: ALTCHOICE

## 2025-04-15 NOTE — PROGRESS NOTES
Karissa MOULTON Wang is 26w3d   Doing well  +FM  Denies LOF, bleeding/spotting, cramping, headache, blurred vision, edema, or RUQ pain.

## 2025-04-15 NOTE — PROGRESS NOTES
Patient here for return OB visit at 26w3d.       She reports good fetal movement.   She denies vaginal bleeding, loss of fluid, abnormal vaginal discharge, UTI symptoms, cramping, or contractions.       /68 (BP Site: Right Upper Arm, Patient Position: Sitting)   Pulse 87   Temp 98.2 °F (36.8 °C) (Oral)   Resp 16   Ht 1.829 m (6')   Wt 83.9 kg (185 lb)   LMP 09/15/2024 (Exact Date)   SpO2 98%   BMI 25.09 kg/m²        Prenatal Fetal Information  Fetal HR: 145  Movement: Present      Patient Active Problem List    Diagnosis Date Noted    Pregnancy 2025     Primary Provider: Efe     EDC by: by 1st TM US not consistent with LMP  Social:  Mode   IOB labs: Hep B neg.Hep C neg. RPR neg. HIV neg. Varicella/Rubella immune O POSITIVE  . Normal hemoglobin electrophoresis.    Genetic Screening:Panoroma low risk  Horizon    Anatomy: with MFM on 3/5/25  3rd TM labs: GTT NA Type I DM  Hgb___ Plts___  Vaccines:   Flu:__ TDAP:__ RSV ___ 32-36 weeks September-January   Rhogam: O POSITIVE     GBS:    Pain management in labor :   -Epidural  -Lamaze   -Hydrotherapy        Postpartum  -Breast/Bottle   -Pap: 23 NILM      Problem List:  1) Type I DM  -A1C 6.0  -does not have a CGM  -follows with endocrinology at [facility] (Dr Betts)   -Lanus 26 units   - lispro ranges from 8-9 units per meal   -had eye exam at the beginning of pregnancy   -repeating A1c 4/15/25           Pregestational diabetes mellitus, modified White class B 01/15/2025       Return for 30 weeks, 32, 34, 36, 37, 38, 39 .    Coretta Wilks MD

## 2025-04-16 ENCOUNTER — RESULTS FOLLOW-UP (OUTPATIENT)
Age: 30
End: 2025-04-16

## 2025-04-16 LAB
ERYTHROCYTE [DISTWIDTH] IN BLOOD BY AUTOMATED COUNT: 12.1 % (ref 11.5–14.5)
EST. AVERAGE GLUCOSE BLD GHB EST-MCNC: 97 MG/DL
FERRITIN SERPL-MCNC: 10 NG/ML (ref 8–252)
HBA1C MFR BLD: 5 % (ref 4–5.6)
HCT VFR BLD AUTO: 36.2 % (ref 35–47)
HGB BLD-MCNC: 11.5 G/DL (ref 11.5–16)
MCH RBC QN AUTO: 30.2 PG (ref 26–34)
MCHC RBC AUTO-ENTMCNC: 31.8 G/DL (ref 30–36.5)
MCV RBC AUTO: 95 FL (ref 80–99)
NRBC # BLD: 0 K/UL (ref 0–0.01)
NRBC BLD-RTO: 0 PER 100 WBC
PLATELET # BLD AUTO: 231 K/UL (ref 150–400)
PMV BLD AUTO: 11.9 FL (ref 8.9–12.9)
RBC # BLD AUTO: 3.81 M/UL (ref 3.8–5.2)
WBC # BLD AUTO: 10.2 K/UL (ref 3.6–11)

## 2025-04-16 RX ORDER — FERROUS SULFATE 325(65) MG
325 TABLET, DELAYED RELEASE (ENTERIC COATED) ORAL
Qty: 30 TABLET | Refills: 11 | Status: SHIPPED | OUTPATIENT
Start: 2025-04-16

## 2025-04-17 LAB — T PALLIDUM AB SER QL IA: NON REACTIVE

## 2025-05-05 ENCOUNTER — ROUTINE PRENATAL (OUTPATIENT)
Age: 30
End: 2025-05-05
Payer: COMMERCIAL

## 2025-05-05 VITALS — DIASTOLIC BLOOD PRESSURE: 76 MMHG | SYSTOLIC BLOOD PRESSURE: 126 MMHG | HEART RATE: 90 BPM

## 2025-05-05 DIAGNOSIS — Z34.90 PREGNANCY, UNSPECIFIED GESTATIONAL AGE: Primary | ICD-10-CM

## 2025-05-05 PROCEDURE — G8427 DOCREV CUR MEDS BY ELIG CLIN: HCPCS | Performed by: OBSTETRICS & GYNECOLOGY

## 2025-05-05 PROCEDURE — 1036F TOBACCO NON-USER: CPT | Performed by: OBSTETRICS & GYNECOLOGY

## 2025-05-05 PROCEDURE — 99213 OFFICE O/P EST LOW 20 MIN: CPT | Performed by: OBSTETRICS & GYNECOLOGY

## 2025-05-05 PROCEDURE — G8419 CALC BMI OUT NRM PARAM NOF/U: HCPCS | Performed by: OBSTETRICS & GYNECOLOGY

## 2025-05-05 PROCEDURE — 76816 OB US FOLLOW-UP PER FETUS: CPT | Performed by: OBSTETRICS & GYNECOLOGY

## 2025-05-05 NOTE — PROCEDURES
PATIENT: EVA MALLORY.   -  : 1995   -  DOS:2025   -  INTERPRETING PROVIDER:Jeremie Mcdermott,   Indication  ========    Type 1 DM (on insulin)    Method  ======    Transabdominal ultrasound examination. View: Good view    Pregnancy  =========    Shetty pregnancy. Number of fetuses: 1    Dating  ======    Previous Ultrasound on: 2024  Type of prior assessment: GA  GA at prior assessment date 9 w + 5 d  GA by previous U/S 29 w + 2 d  JONG by previous Ultrasound: 2025  Ultrasound examination on: 2025  GA by U/S based upon: AC, BPD, Femur, HC  GA by U/S 29 w + 5 d  JONG by U/S: 2025  Assigned: based on ultrasound (GA), selected on 01/15/2025  Assigned GA 29 w + 2 d  Assigned JONG: 2025    Fetal Biometry  ============    Standard  BPD 72.2 mm 29w 0d 28% Hadlock  OFD 96.8 mm 31w 2d 92% Akiko  .2 mm 29w 2d 19% Hadlock  Cerebellum tr 33.8 mm 28w 4d 30% Hill  .9 mm 31w 6d 97% Hadlock  Femur 54.6 mm 28w 6d 23% Hadlock  EFW 1,574 g 30w 1d 78% Hadlock  EFW (lb) 3 lb  EFW (oz) 8 oz  EFW by: Hadlock (BPD-HC-AC-FL)  Extended   3.4 mm  Other Structures   bpm    General Evaluation  ==============    Cardiac activity present.  bpm. Fetal movements: visualized. Presentation: BREECH  Placenta: Placental site: posterior, appropriate distance from the internal os  Umbilical cord: Cord vessels: 3 vessel cord. Insertion site: central  Amniotic fluid: Amount of AF: normal. MVP 7.7 cm. GREGORIO 23.2 cm. Q1 3.7 cm, Q2 7.7 cm, Q3 7.2 cm, Q4 4.6 cm    Fetal Anatomy  ===========    Stomach: normal  Kidneys: normal  Bladder: normal  Wants to know fetal sex: yes    Findings  =======    Intrauterine Shetty pregnancy at 29w 2d by clinical dates.  EFW is 1574 g at 78%, abdominal circumference at 97%.  Amniotic fluid: normal.  Placenta is posterior, appropriate distance from the internal os.  BREECH presentation.    The ultrasound findings as listed above and diagnostic limitations

## 2025-05-06 ENCOUNTER — ROUTINE PRENATAL (OUTPATIENT)
Age: 30
End: 2025-05-06
Payer: COMMERCIAL

## 2025-05-06 VITALS
RESPIRATION RATE: 16 BRPM | WEIGHT: 184 LBS | TEMPERATURE: 98.1 F | HEIGHT: 72 IN | HEART RATE: 94 BPM | BODY MASS INDEX: 24.92 KG/M2 | OXYGEN SATURATION: 98 % | SYSTOLIC BLOOD PRESSURE: 128 MMHG | DIASTOLIC BLOOD PRESSURE: 61 MMHG

## 2025-05-06 DIAGNOSIS — Z34.90 PREGNANCY, UNSPECIFIED GESTATIONAL AGE: Primary | ICD-10-CM

## 2025-05-06 PROCEDURE — 90471 IMMUNIZATION ADMIN: CPT | Performed by: OBSTETRICS & GYNECOLOGY

## 2025-05-06 PROCEDURE — 0502F SUBSEQUENT PRENATAL CARE: CPT | Performed by: OBSTETRICS & GYNECOLOGY

## 2025-05-06 PROCEDURE — 90715 TDAP VACCINE 7 YRS/> IM: CPT | Performed by: OBSTETRICS & GYNECOLOGY

## 2025-05-06 NOTE — PROGRESS NOTES
Patient here for return OB visit at 29w3d.       She reports good fetal movement.   She denies vaginal bleeding, loss of fluid, abnormal vaginal discharge, UTI symptoms, cramping, or contractions.       /61 (BP Site: Left Upper Arm, Patient Position: Sitting)   Pulse 94   Temp 98.1 °F (36.7 °C) (Oral)   Resp 16   Ht 1.829 m (6')   Wt 83.5 kg (184 lb)   LMP 09/15/2024 (Exact Date)   SpO2 98%   BMI 24.95 kg/m²        Prenatal Fetal Information  Fetal HR: 140  Movement: Present      Patient Active Problem List    Diagnosis Date Noted    Pregnancy 2025     Primary Provider: Efe     EDC by: by 1st TM US not consistent with LMP  Social:  Mode   IOB labs: Hep B neg.Hep C neg. RPR neg. HIV neg. Varicella/Rubella immune O POSITIVE  . Normal hemoglobin electrophoresis.    Genetic Screening:Panoroma low risk  Horizon    Anatomy: with MFM on 3/5/25  3rd TM labs: GTT NA Type I DM  Hgb___ Plts___  Vaccines:   Flu:__ TDAP: 25 RSV ___ 32-36 weeks September-January   Rhogam: O POSITIVE     GBS:    Pain management in labor :   -Epidural  -Lamaze   -Hydrotherapy        Postpartum  -Breast/Bottle   -Pap: 23 NILM      Problem List:  1) Type I DM  -A1C 6.0  -does not have a CGM  -follows with endocrinology at [facility] (Dr Betts)   -Lanus 26 units   - lispro ranges from 8-9 units per meal   -had eye exam at the beginning of pregnancy   -repeating A1c 4/15/25           Pregestational diabetes mellitus, modified White class B 01/15/2025       No follow-ups on file.    Coretta Wilks MD

## 2025-05-06 NOTE — PROGRESS NOTES
Karissa Wang is 29w3d   Doing well  +FM  Denies LOF, bleeding/spotting, cramping, headache, blurred vision, edema, or RUQ pain.     Verbal order obtained from Coretta Wilks MD for Tdap vaccine and a diagnosis of pregnancy. Order read back to MD. Orders signed by this writer and sent for co-sign to MD.

## 2025-05-13 ENCOUNTER — ROUTINE PRENATAL (OUTPATIENT)
Age: 30
End: 2025-05-13

## 2025-05-13 VITALS
OXYGEN SATURATION: 98 % | BODY MASS INDEX: 24.95 KG/M2 | SYSTOLIC BLOOD PRESSURE: 121 MMHG | DIASTOLIC BLOOD PRESSURE: 81 MMHG | WEIGHT: 184 LBS | HEART RATE: 75 BPM

## 2025-05-13 DIAGNOSIS — O36.8190 DECREASED FETAL MOVEMENT DURING PREGNANCY, ANTEPARTUM, SINGLE OR UNSPECIFIED FETUS: Primary | ICD-10-CM

## 2025-05-13 DIAGNOSIS — Z34.90 PREGNANCY, UNSPECIFIED GESTATIONAL AGE: Primary | ICD-10-CM

## 2025-05-13 PROCEDURE — 0502F SUBSEQUENT PRENATAL CARE: CPT | Performed by: OBSTETRICS & GYNECOLOGY

## 2025-05-13 NOTE — PROGRESS NOTES
Patient here for a problem OB visit at 30w3d.     Kick less intense today.   BPP 8/8  Reassurance given.     She denies vaginal bleeding, loss of fluid, abnormal vaginal discharge, UTI symptoms, cramping, or contractions.       /81   Pulse 75   LMP 09/15/2024 (Exact Date)          Patient Active Problem List    Diagnosis Date Noted    Pregnancy 2025     Primary Provider: Efe     EDC by: by 1st TM US not consistent with LMP  Social:  Mode   IOB labs: Hep B neg.Hep C neg. RPR neg. HIV neg. Varicella/Rubella immune O POSITIVE  . Normal hemoglobin electrophoresis.    Genetic Screening:Panoroma low risk  Horizon    Anatomy: with MFM on 3/5/25  3rd TM labs: GTT NA Type I DM  Hgb___ Plts___  Vaccines:   Flu:__ TDAP: 25 RSV ___ 32-36 weeks September-January   Rhogam: O POSITIVE     GBS:    Pain management in labor :   -Epidural  -Lamaze   -Hydrotherapy        Postpartum  -Breast/Bottle   -Pap: 23 NILM      Problem List:  1) Type I DM  -A1C 6.0  -does not have a CGM  -follows with endocrinology at [facility] (Dr Betts)   -Lanus 26 units   - lispro ranges from 8-9 units per meal   -had eye exam at the beginning of pregnancy   -repeating A1c 4/15/25           Pregestational diabetes mellitus, modified White class B 01/15/2025       Return in about 1 week (around 2025).    Coretta Wilks MD

## 2025-05-13 NOTE — PROGRESS NOTES
Pt added on today for changes in FM  She reports babies kicks are not as strong.  Denies lof, vb, or pain.  BPP today

## 2025-05-20 ENCOUNTER — ROUTINE PRENATAL (OUTPATIENT)
Age: 30
End: 2025-05-20

## 2025-05-20 ENCOUNTER — RESULTS FOLLOW-UP (OUTPATIENT)
Age: 30
End: 2025-05-20

## 2025-05-20 VITALS
HEIGHT: 72 IN | BODY MASS INDEX: 25.19 KG/M2 | DIASTOLIC BLOOD PRESSURE: 88 MMHG | TEMPERATURE: 98.4 F | RESPIRATION RATE: 16 BRPM | SYSTOLIC BLOOD PRESSURE: 126 MMHG | WEIGHT: 186 LBS | HEART RATE: 79 BPM | OXYGEN SATURATION: 96 %

## 2025-05-20 DIAGNOSIS — Z34.90 PREGNANCY, UNSPECIFIED GESTATIONAL AGE: Primary | ICD-10-CM

## 2025-05-20 LAB
T4 FREE SERPL-MCNC: 0.9 NG/DL (ref 0.8–1.5)
TSH SERPL DL<=0.05 MIU/L-ACNC: 2.24 UIU/ML (ref 0.36–3.74)

## 2025-05-20 PROCEDURE — 0502F SUBSEQUENT PRENATAL CARE: CPT | Performed by: OBSTETRICS & GYNECOLOGY

## 2025-05-20 RX ORDER — RESPIRATORY SYNCYTIAL VIRUS VACCINE 120MCG/0.5
0.5 KIT INTRAMUSCULAR ONCE
Qty: 0.5 ML | Refills: 0 | Status: SHIPPED | OUTPATIENT
Start: 2025-05-20 | End: 2025-05-20

## 2025-05-20 NOTE — PROGRESS NOTES
Patient here for return OB visit at 31w3d.       She reports good fetal movement.   She denies vaginal bleeding, loss of fluid, abnormal vaginal discharge, UTI symptoms, cramping, or contractions.       /88 (BP Site: Left Upper Arm, Patient Position: Sitting)   Pulse 79   Temp 98.4 °F (36.9 °C) (Oral)   Resp 16   Ht 1.829 m (6')   Wt 84.4 kg (186 lb)   LMP 09/15/2024 (Exact Date)   SpO2 96%   BMI 25.23 kg/m²        Prenatal Fetal Information  Fetal HR: 145  Movement: Present      Patient Active Problem List    Diagnosis Date Noted    Pregnancy 2025     Primary Provider: Efe     EDC by: by 1st TM US not consistent with LMP  Social:  Mode   IOB labs: Hep B neg.Hep C neg. RPR neg. HIV neg. Varicella/Rubella immune O POSITIVE  . Normal hemoglobin electrophoresis.    Genetic Screening:Panoroma low risk  Horizon    Anatomy: with MFM on 3/5/25  3rd TM labs: GTT NA Type I DM  Hgb___ Plts___  Vaccines:   Flu:__ TDAP: 25 RSV ___ 32-36 weeks September-January   Rhogam: O POSITIVE     GBS:    Pain management in labor :   -Epidural  -Lamaze   -Hydrotherapy        Postpartum  -Breast/Bottle   -Pap: 23 NILM      Problem List:  1) Type I DM  -A1C 6.0  -does not have a CGM  -follows with endocrinology at [facility] (Dr Betts)   -Lanus 26 units   - lispro ranges from 8-9 units per meal   -had eye exam at the beginning of pregnancy   -repeating A1c 4/15/25           Pregestational diabetes mellitus, modified White class B 01/15/2025       Return in about 2 weeks (around 6/3/2025).    Coretta Wilks MD

## 2025-05-20 NOTE — PROGRESS NOTES
Karissa SAIGE Wang is 31w3d   Doing well  +FM  Denies LOF, bleeding/spotting, cramping, headache, blurred vision, edema, or RUQ pain.

## 2025-06-02 ENCOUNTER — ROUTINE PRENATAL (OUTPATIENT)
Age: 30
End: 2025-06-02
Payer: COMMERCIAL

## 2025-06-02 VITALS — HEART RATE: 87 BPM | DIASTOLIC BLOOD PRESSURE: 70 MMHG | SYSTOLIC BLOOD PRESSURE: 107 MMHG

## 2025-06-02 DIAGNOSIS — Z34.90 PREGNANCY, UNSPECIFIED GESTATIONAL AGE: ICD-10-CM

## 2025-06-02 PROCEDURE — 99214 OFFICE O/P EST MOD 30 MIN: CPT | Performed by: STUDENT IN AN ORGANIZED HEALTH CARE EDUCATION/TRAINING PROGRAM

## 2025-06-02 PROCEDURE — G8419 CALC BMI OUT NRM PARAM NOF/U: HCPCS | Performed by: STUDENT IN AN ORGANIZED HEALTH CARE EDUCATION/TRAINING PROGRAM

## 2025-06-02 PROCEDURE — G8427 DOCREV CUR MEDS BY ELIG CLIN: HCPCS | Performed by: STUDENT IN AN ORGANIZED HEALTH CARE EDUCATION/TRAINING PROGRAM

## 2025-06-02 PROCEDURE — 1036F TOBACCO NON-USER: CPT | Performed by: STUDENT IN AN ORGANIZED HEALTH CARE EDUCATION/TRAINING PROGRAM

## 2025-06-02 PROCEDURE — 59025 FETAL NON-STRESS TEST: CPT | Performed by: STUDENT IN AN ORGANIZED HEALTH CARE EDUCATION/TRAINING PROGRAM

## 2025-06-02 PROCEDURE — 76816 OB US FOLLOW-UP PER FETUS: CPT | Performed by: STUDENT IN AN ORGANIZED HEALTH CARE EDUCATION/TRAINING PROGRAM

## 2025-06-02 NOTE — PROCEDURES
PATIENT: EVA MALLORY.   -  : 1995   -  DOS:2025   -  INTERPRETING PROVIDER:Sadaf Eagle,   Indication  ========    Type 1 DM (on insulin)    Method  ======    Transabdominal ultrasound examination and external fetal monitor. View: Sufficient    Pregnancy  =========    Shetty pregnancy. Number of fetuses: 1    Dating  ======    Previous Ultrasound on: 2024  Type of prior assessment: GA  GA at prior assessment date 9 w + 5 d  GA by previous U/S 33 w + 2 d  JONG by previous Ultrasound: 2025  Ultrasound examination on: 2025  GA by U/S based upon: AC, BPD, Femur, HC  GA by U/S 33 w + 2 d  JONG by U/S: 2025  Assigned: based on ultrasound (GA), selected on 01/15/2025  Assigned GA 33 w + 2 d  Assigned JONG: 2025    Fetal Biometry  ============    Standard  BPD 82.4 mm 33w 1d 40% Hadlock  .4 mm 35w 1d 84% Akiko  .9 mm 34w 0d 32% Hadlock  .7 mm 33w 4d 60% Hadlock  Femur 62.3 mm 32w 2d 15% Hadlock  EFW 2,141 g 32w 6d 39% Hadlock  EFW (lb) 4 lb  EFW (oz) 12 oz  EFW by: Hadlock (BPD-HC-AC-FL)  Other Structures   bpm    General Evaluation  ==============    Cardiac activity present.  bpm. Fetal movements: visualized. Presentation: Cephalic  Placenta: Placental site: posterior, appropriate distance from the internal os  Umbilical cord: Cord vessels: 3 vessel cord. Insertion site: central  Amniotic fluid: Amount of AF: normal. MVP 5.8 cm. GREGORIO 18.1 cm. Q1 5.8 cm, Q2 3.7 cm, Q3 4.0 cm, Q4 4.7 cm    Fetal Anatomy  ===========    Stomach: normal  Kidneys: normal  Bladder: normal  Wants to know fetal sex: yes    Non Stress Test  =============    NST interpretation: reactive. Test duration 20 min. Baseline  bpm. Baseline variability: moderate. Accelerations: present. Decelerations: absent. Uterine activity:  absent    Findings  =======    Intrauterine Shetty pregnancy at 33w 2d by clinical dates.  EFW is 2141 g at 39%, abdominal circumference at

## 2025-06-02 NOTE — PROGRESS NOTES
Patient was seen 6/2/2025      Please look under media to view full consult and ultrasound report in ViewPoint.         Sadaf Eagle MD   Maternal Fetal Medicine

## 2025-06-03 ENCOUNTER — RESULTS FOLLOW-UP (OUTPATIENT)
Age: 30
End: 2025-06-03

## 2025-06-03 ENCOUNTER — ROUTINE PRENATAL (OUTPATIENT)
Age: 30
End: 2025-06-03

## 2025-06-03 VITALS
BODY MASS INDEX: 25.76 KG/M2 | DIASTOLIC BLOOD PRESSURE: 73 MMHG | OXYGEN SATURATION: 96 % | SYSTOLIC BLOOD PRESSURE: 111 MMHG | HEIGHT: 72 IN | RESPIRATION RATE: 16 BRPM | WEIGHT: 190.2 LBS | HEART RATE: 81 BPM | TEMPERATURE: 97.5 F

## 2025-06-03 DIAGNOSIS — Z34.90 PREGNANCY, UNSPECIFIED GESTATIONAL AGE: Primary | ICD-10-CM

## 2025-06-03 LAB
CREAT UR-MCNC: 24.9 MG/DL
ERYTHROCYTE [DISTWIDTH] IN BLOOD BY AUTOMATED COUNT: 13.3 % (ref 11.5–14.5)
FERRITIN SERPL-MCNC: 24 NG/ML (ref 26–388)
HCT VFR BLD AUTO: 37.8 % (ref 35–47)
HGB BLD-MCNC: 11.9 G/DL (ref 11.5–16)
LDH SERPL L TO P-CCNC: 138 U/L (ref 81–246)
MCH RBC QN AUTO: 30.6 PG (ref 26–34)
MCHC RBC AUTO-ENTMCNC: 31.5 G/DL (ref 30–36.5)
MCV RBC AUTO: 97.2 FL (ref 80–99)
NRBC # BLD: 0 K/UL (ref 0–0.01)
NRBC BLD-RTO: 0 PER 100 WBC
PLATELET # BLD AUTO: 192 K/UL (ref 150–400)
PMV BLD AUTO: 12.3 FL (ref 8.9–12.9)
PROT UR-MCNC: 13 MG/DL (ref 0–11.9)
PROT/CREAT UR-RTO: 0.5
RBC # BLD AUTO: 3.89 M/UL (ref 3.8–5.2)
WBC # BLD AUTO: 9.9 K/UL (ref 3.6–11)

## 2025-06-03 PROCEDURE — 0502F SUBSEQUENT PRENATAL CARE: CPT | Performed by: OBSTETRICS & GYNECOLOGY

## 2025-06-03 NOTE — PROGRESS NOTES
Patient here for return OB visit at 33w3d.     Blood sugar are well controlled.       She reports good fetal movement.   She denies vaginal bleeding, loss of fluid, abnormal vaginal discharge, UTI symptoms, cramping, or contractions.       /73 (BP Site: Left Upper Arm, Patient Position: Sitting)   Pulse 81   Temp 97.5 °F (36.4 °C) (Oral)   Resp 16   Ht 1.829 m (6')   Wt 86.3 kg (190 lb 3.2 oz)   LMP 09/15/2024 (Exact Date)   SpO2 96%   BMI 25.80 kg/m²        Prenatal Fetal Information  Fetal HR: 145  Movement: Present      Patient Active Problem List    Diagnosis Date Noted    Pregnancy 2025     Primary Provider: Efe     EDC by: by 1st TM US not consistent with LMP  Social:  Mode   IOB labs: Hep B neg.Hep C neg. RPR neg. HIV neg. Varicella/Rubella immune O POSITIVE  . Normal hemoglobin electrophoresis.    Genetic Screening:Panoroma low risk  Horizon    Anatomy: with MFM on 3/5/25  3rd TM labs: GTT NA Type I DM  Hgb___ Plts___  Vaccines:   Flu:__ TDAP: 25 RSV ___ 32-36 weeks September-January   Rhogam: O POSITIVE     GBS:    Pain management in labor :   -Epidural  -Lamaze   -Hydrotherapy        Postpartum  -Breast/Bottle   -Pap: 23 NILM      Problem List:  1) Type I DM  -A1C 6.0  -does not have a CGM  -follows with endocrinology at [facility] (Dr Betts)   -Lanus 26 units   - lispro ranges from 8-9 units per meal   -had eye exam at the beginning of pregnancy   -repeating A1c 4/15/25           Pregestational diabetes mellitus, modified White class B 01/15/2025       No follow-ups on file.    Coretta Wilks MD

## 2025-06-03 NOTE — PROGRESS NOTES
Karissa SAIGE Wang is 33w3d   Doing well  +FM  Denies LOF, bleeding/spotting, cramping, headache, blurred vision, edema, or RUQ pain.

## 2025-06-06 ENCOUNTER — TELEPHONE (OUTPATIENT)
Age: 30
End: 2025-06-06

## 2025-06-09 ENCOUNTER — ROUTINE PRENATAL (OUTPATIENT)
Age: 30
End: 2025-06-09
Payer: COMMERCIAL

## 2025-06-09 ENCOUNTER — CLINICAL SUPPORT (OUTPATIENT)
Age: 30
End: 2025-06-09

## 2025-06-09 ENCOUNTER — RESULTS FOLLOW-UP (OUTPATIENT)
Age: 30
End: 2025-06-09

## 2025-06-09 VITALS — SYSTOLIC BLOOD PRESSURE: 110 MMHG | DIASTOLIC BLOOD PRESSURE: 72 MMHG | HEART RATE: 93 BPM

## 2025-06-09 DIAGNOSIS — Z34.90 PREGNANCY, UNSPECIFIED GESTATIONAL AGE: Primary | ICD-10-CM

## 2025-06-09 DIAGNOSIS — Z34.90 PREGNANCY, UNSPECIFIED GESTATIONAL AGE: ICD-10-CM

## 2025-06-09 LAB
COLLECT DURATION TIME UR: 24 HR
PROT 24H UR-MRATE: 400 MG/24HR
SPECIMEN VOL ?TM UR: 4000 ML

## 2025-06-09 PROCEDURE — 59025 FETAL NON-STRESS TEST: CPT | Performed by: STUDENT IN AN ORGANIZED HEALTH CARE EDUCATION/TRAINING PROGRAM

## 2025-06-09 PROCEDURE — 99213 OFFICE O/P EST LOW 20 MIN: CPT | Performed by: STUDENT IN AN ORGANIZED HEALTH CARE EDUCATION/TRAINING PROGRAM

## 2025-06-09 PROCEDURE — 76815 OB US LIMITED FETUS(S): CPT | Performed by: STUDENT IN AN ORGANIZED HEALTH CARE EDUCATION/TRAINING PROGRAM

## 2025-06-09 NOTE — PROCEDURES
PATIENT: EVA MALLORY.   -  : 1995   -  DOS:2025   -  INTERPRETING PROVIDER:Sadaf Eagle,   Indication  ========    Type 1 DM (on insulin)    Method  ======    External Fetal Monitor and transabdominal ultrasound examination, View: Sufficient    Pregnancy  =========    Shetty pregnancy. Number of fetuses: 1    Dating  ======    Previous Ultrasound on: 2024  Type of prior assessment: GA  GA at prior assessment date 9 w + 5 d  GA by previous U/S 34 w + 2 d  JONG by previous Ultrasound: 2025  Assigned: based on ultrasound (GA), selected on 01/15/2025  Assigned GA 34 w + 2 d  Assigned JONG: 2025    General Evaluation  ==============    Cardiac activity present.  bpm. Fetal movements: visualized, visualized. Presentation: Cephalic  Placenta: Placental site: posterior, appropriate distance from the internal os  Umbilical cord: Cord vessels: 3 vessel cord    Fetal Anatomy  ===========    Stomach: normal  Kidneys: normal  Bladder: normal  Wants to know fetal sex: yes    Amniotic Fluid Assessment  =====================    Amount of AF: normal  MVP 5.6 cm. GREGORIO 17.2 cm. Q1 4.5 cm, Q2 3.7 cm, Q3 3.4 cm, Q4 5.6 cm    Non Stress Test  =============    NST interpretation: reactive. Test duration 20 min. Baseline  bpm. Baseline variability: moderate. Accelerations: present. Decelerations: absent. Uterine activity:  present, patient comfortable    Findings  =======    Intrauterine Shetty pregnancy at 34w 2d by clinical dates.  Amniotic fluid: normal.  Placenta is posterior, appropriate distance from the internal os.  Cephalic presentation.    NST is reactive. Modified BPP is normal.    The ultrasound findings as listed above and diagnostic limitations of ultrasound imaging, including inability to exclude all anomalies, have been reviewed with the patient. All  questions and concerns addressed. Intrauterine    Consultation  ==========    ANUP is 30 yrs of age, G1 at 34w

## 2025-06-09 NOTE — PROGRESS NOTES
Verbal order obtained from Coretta Wilks MD for 24 hr urine and a diagnosis of pregnancy. Order read back to MD. Orders signed by this writer and sent for co-sign to MD.

## 2025-06-09 NOTE — PROGRESS NOTES
Patient was seen 6/9/2025      Please look under media to view full consult and ultrasound report in ViewPoint.         Sadaf Eagle MD   Maternal Fetal Medicine

## 2025-06-16 ENCOUNTER — ROUTINE PRENATAL (OUTPATIENT)
Age: 30
End: 2025-06-16
Payer: COMMERCIAL

## 2025-06-16 VITALS — HEART RATE: 84 BPM | DIASTOLIC BLOOD PRESSURE: 71 MMHG | SYSTOLIC BLOOD PRESSURE: 104 MMHG

## 2025-06-16 DIAGNOSIS — Z34.90 PREGNANCY, UNSPECIFIED GESTATIONAL AGE: Primary | ICD-10-CM

## 2025-06-16 PROCEDURE — 99213 OFFICE O/P EST LOW 20 MIN: CPT | Performed by: OBSTETRICS & GYNECOLOGY

## 2025-06-16 PROCEDURE — G8419 CALC BMI OUT NRM PARAM NOF/U: HCPCS | Performed by: OBSTETRICS & GYNECOLOGY

## 2025-06-16 PROCEDURE — G8427 DOCREV CUR MEDS BY ELIG CLIN: HCPCS | Performed by: OBSTETRICS & GYNECOLOGY

## 2025-06-16 PROCEDURE — 1036F TOBACCO NON-USER: CPT | Performed by: OBSTETRICS & GYNECOLOGY

## 2025-06-16 PROCEDURE — 76818 FETAL BIOPHYS PROFILE W/NST: CPT | Performed by: OBSTETRICS & GYNECOLOGY

## 2025-06-16 NOTE — PROGRESS NOTES
Patient was seen 6/16/2025      Please look under media to view full consult and ultrasound report in ViewPoint.         Jeremie Mcdermott MD  Maternal Fetal Medicine

## 2025-06-16 NOTE — PROCEDURES
PATIENT: EVA MALLORY.   -  : 1995   -  DOS:2025   -  INTERPRETING PROVIDER:Jeremie Mcdermott,   Indication  ========    Type 1 DM (on insulin)    Method  ======    External Fetal Monitor and transabdominal ultrasound examination. View: Good view    Pregnancy  =========    Shetty pregnancy. Number of fetuses: 1    Dating  ======    Previous Ultrasound on: 2024  Type of prior assessment: GA  GA at prior assessment date 9 w + 5 d  GA by previous U/S 35 w + 2 d  JONG by previous Ultrasound: 2025  Assigned: based on ultrasound (GA), selected on 01/15/2025  Assigned GA 35 w + 2 d  Assigned JONG: 2025    General Evaluation  ==============    Cardiac activity present.  bpm. Fetal movements: visualized. Presentation: Cephalic  Placenta: Placental site: posterior, appropriate distance from the internal os  Umbilical cord: Cord vessels: 3 vessel cord    Fetal Anatomy  ===========    Stomach: normal  Kidneys: normal  Bladder: normal  Wants to know fetal sex: yes    Amniotic Fluid Assessment  =====================    Amount of AF: normal  MVP 5.3 cm. GREGORIO 16.4 cm. Q1 3.4 cm, Q2 3.2 cm, Q3 4.6 cm, Q4 5.3 cm    Biophysical Profile  ==============    2: Fetal breathing movements  2: Gross body movements  2: Fetal tone  2: Amniotic fluid volume  NST: non-reactive  8/10 Biophysical profile score    Non Stress Test  =============    NST interpretation: non-reactive. Test duration 20 min. Baseline  bpm. Baseline variability: moderate. Accelerations: present. Decelerations: absent. Uterine  activity: absent    Findings  =======    Intrauterine Shetty pregnancy at 35w 2d by clinical dates.  Amniotic fluid: normal.  Placenta is posterior, appropriate distance from the internal os.  Cephalic presentation.  Biophysical profile score is 8/10.  NST is non-reactive.  The ultrasound findings as listed above and diagnostic limitations of ultrasound imaging, including inability to exclude all

## 2025-06-17 ENCOUNTER — ROUTINE PRENATAL (OUTPATIENT)
Age: 30
End: 2025-06-17

## 2025-06-17 VITALS
HEART RATE: 65 BPM | TEMPERATURE: 98.4 F | HEIGHT: 72 IN | RESPIRATION RATE: 16 BRPM | DIASTOLIC BLOOD PRESSURE: 86 MMHG | BODY MASS INDEX: 26.14 KG/M2 | OXYGEN SATURATION: 98 % | SYSTOLIC BLOOD PRESSURE: 126 MMHG | WEIGHT: 193 LBS

## 2025-06-17 DIAGNOSIS — Z34.90 PREGNANCY, UNSPECIFIED GESTATIONAL AGE: Primary | ICD-10-CM

## 2025-06-17 PROCEDURE — 0502F SUBSEQUENT PRENATAL CARE: CPT | Performed by: OBSTETRICS & GYNECOLOGY

## 2025-06-17 NOTE — PROGRESS NOTES
Karissa GNiki Kathleen is 35w3d   Doing well  +FM  Denies LOF, bleeding/spotting, cramping, headache, blurred vision, edema, or RUQ pain.

## 2025-06-17 NOTE — PROGRESS NOTES
Patient here for return OB visit at 35w3d.       She reports good fetal movement.   She denies vaginal bleeding, loss of fluid, abnormal vaginal discharge, UTI symptoms, cramping, or contractions.       /86 (BP Site: Left Upper Arm, Patient Position: Sitting)   Pulse 65   Temp 98.4 °F (36.9 °C) (Oral)   Resp 16   Ht 1.829 m (6')   Wt 87.5 kg (193 lb)   LMP 09/15/2024 (Exact Date)   SpO2 98%   BMI 26.18 kg/m²        Prenatal Fetal Information  Fetal HR: 135  Movement: Present      Patient Active Problem List    Diagnosis Date Noted    Pregnancy 2025     Primary Provider: Efe     EDC by: by 1st TM US not consistent with LMP  Social:  Mode   IOB labs: Hep B neg.Hep C neg. RPR neg. HIV neg. Varicella/Rubella immune O POSITIVE  . Normal hemoglobin electrophoresis.    Genetic Screening:Panoroma low risk  Horizon    Anatomy: with MFM on 3/5/25  3rd TM labs: GTT NA Type I DM  Hgb___ Plts___  Vaccines:   Flu:__ TDAP: 25 RSV ___ 32-36 weeks September-January   Rhogam: O POSITIVE     GBS:    Pain management in labor :   -Epidural  -Lamaze   -Hydrotherapy        Postpartum  -Breast/Bottle   -Pap: 23 NILM      Problem List:  1) Type I DM  -A1C 6.0  -does not have a CGM  -follows with endocrinology at [facility] (Dr Betts)   -Lanus 26 units   - lispro ranges from 8-9 units per meal   -had eye exam at the beginning of pregnancy   -repeating A1c 4/15/25           Pregestational diabetes mellitus, modified White class B 01/15/2025       Return in about 1 week (around 2025).    Coretta Wilks MD

## 2025-06-23 ENCOUNTER — ROUTINE PRENATAL (OUTPATIENT)
Age: 30
End: 2025-06-23
Payer: COMMERCIAL

## 2025-06-23 VITALS — HEART RATE: 84 BPM | DIASTOLIC BLOOD PRESSURE: 82 MMHG | SYSTOLIC BLOOD PRESSURE: 133 MMHG

## 2025-06-23 DIAGNOSIS — O24.319 PREGESTATIONAL DIABETES MELLITUS, MODIFIED WHITE CLASS B: Primary | ICD-10-CM

## 2025-06-23 DIAGNOSIS — Z34.90 PREGNANCY, UNSPECIFIED GESTATIONAL AGE: ICD-10-CM

## 2025-06-23 PROCEDURE — 59025 FETAL NON-STRESS TEST: CPT | Performed by: OBSTETRICS & GYNECOLOGY

## 2025-06-23 PROCEDURE — 76815 OB US LIMITED FETUS(S): CPT | Performed by: OBSTETRICS & GYNECOLOGY

## 2025-06-23 PROCEDURE — G8419 CALC BMI OUT NRM PARAM NOF/U: HCPCS

## 2025-06-23 PROCEDURE — 1036F TOBACCO NON-USER: CPT

## 2025-06-23 PROCEDURE — 2022F DILAT RTA XM EVC RTNOPTHY: CPT

## 2025-06-23 PROCEDURE — 3044F HG A1C LEVEL LT 7.0%: CPT

## 2025-06-23 PROCEDURE — G8428 CUR MEDS NOT DOCUMENT: HCPCS

## 2025-06-23 PROCEDURE — 99212 OFFICE O/P EST SF 10 MIN: CPT

## 2025-06-23 NOTE — PROGRESS NOTES
Provided verbal communication regarding Non-Stress Test (NST), fetal movement counts, labor precautions, and signs and symptoms of pre-eclampsia. Patient verbalized understanding of the information provided. All patient questions were answered.

## 2025-06-23 NOTE — PROGRESS NOTES
Patient was seen 6/23/2025      Please look under media to view full consult and ultrasound report in ViewPoint.         Jeremie Mcdermott MD  Maternal Fetal Medicine

## 2025-06-23 NOTE — PROGRESS NOTES
Assessment & Plan   ASSESSMENT/PLAN:  1. Pregestational diabetes mellitus, modified White class B    KARISSA is 30 yrs of age, G1 at 36w 2d.      Reactive NST, normal GREGORIO today.      Pregestational DM:   - Patient was diagnosed with Type 1DM approximately 10 years ago. She has not had DKA. She does not have a CGM and would like to avoid one if possible and is followed closely with Dr. Betts at [facility] endocrinology. Neither a pump or CGM desired.  - She has been sending her log to endocrine and monitors very closely.   - A1C 5.0 on 4/15; TSH/Free T4 normal;   - Continue ldASA for preE prophylaxis   - Recommend baseline preE labs: CBC CMP normal; baseline PC ratio 0.5 on 6/3, 24hr urine 400  - BPs normotensive. Denies headaches, visual changes, right upper quadrant or epigastric pain.   - Ophthalmology consult to rule out retinopathy -- normal in December  - msAFP wnl  - fetal echocardiography - normal on 3/18 and .   - Diabetes managed by endocrine   - Current regimen: Lantus 35 units daily, Humalog CHO ratio 1:9-11  - Kick count instructions, PIH and PTL precautions reviewed.      Negative carrier screening and low risk NIPT     2025 1134 I have reviewed the ultrasound images from today. I have reviewed and approved the NP care/treatment plan.  Jeremie Mcdermott MD  Maternal/Fetal Medicine     Please see Viewpoint for ultrasound findings.       Subjective   Karissa Wang (:  1995) is a 30 y.o. female,Established patient, here for evaluation of the following chief complaint(s):  1. Pregestational diabetes mellitus, modified White class B    Objective   Physical Exam  Vitals reviewed.   Constitutional:       Appearance: Normal appearance.   Neurological:      Mental Status: She is alert.   Psychiatric:         Mood and Affect: Mood normal.         Judgment: Judgment normal.       On this date 2025  I have spent time reviewing previous notes, test results and discussing the diagnosis and

## 2025-06-23 NOTE — PROCEDURES
PATIENT: EVA MALLORY.   -  : 1995   -  DOS:2025   -  INTERPRETING PROVIDER:Jeremie Mcdermott,   Indication  ========    Type 1 DM (on insulin)    Method  ======    External Fetal Monitor and transabdominal ultrasound examination. View: Good view    Pregnancy  =========    Shetty pregnancy. Number of fetuses: 1    Dating  ======    Previous Ultrasound on: 2024  Type of prior assessment: GA  GA at prior assessment date 9 w + 5 d  GA by previous U/S 36 w + 2 d  JONG by previous Ultrasound: 2025  Assigned: based on ultrasound (GA), selected on 01/15/2025  Assigned GA 36 w + 2 d  Assigned JONG: 2025    General Evaluation  ==============    Cardiac activity present.  bpm. Fetal movements: visualized. Presentation: Cephalic  Placenta: Placental site: posterior, appropriate distance from the internal os  Umbilical cord: Cord vessels: 3 vessel cord    Fetal Anatomy  ===========    Stomach: normal  Kidneys: normal  Bladder: normal  Wants to know fetal sex: yes    Amniotic Fluid Assessment  =====================    Amount of AF: normal  MVP 5.5 cm. GREGORIO 13.3 cm. Q1 2.1 cm, Q2 3.8 cm, Q3 2.0 cm, Q4 5.5 cm    Non Stress Test  =============    NST interpretation: reactive. Test duration 20 min. Baseline  bpm. Baseline variability: moderate. Accelerations: present. Decelerations: absent. Uterine activity:  present, one contraction noted, patient rates 0/10    Findings  =======    Intrauterine Shetty pregnancy at 36w 2d by clinical dates.  Amniotic fluid: normal.  Placenta is posterior, appropriate distance from the internal os.  Cephalic presentation.    NST is reactive. Modified BPP is normal.    The ultrasound findings as listed above and diagnostic limitations of ultrasound imaging, including inability to exclude all anomalies, have been reviewed with the patient. All  questions and concerns addressed.    Consultation  ==========    NP note 25    ANUP is 30 yrs of age,

## 2025-06-24 ENCOUNTER — ROUTINE PRENATAL (OUTPATIENT)
Age: 30
End: 2025-06-24

## 2025-06-24 VITALS
SYSTOLIC BLOOD PRESSURE: 132 MMHG | OXYGEN SATURATION: 97 % | HEIGHT: 72 IN | DIASTOLIC BLOOD PRESSURE: 80 MMHG | RESPIRATION RATE: 16 BRPM | TEMPERATURE: 98 F | HEART RATE: 83 BPM | WEIGHT: 192 LBS | BODY MASS INDEX: 26.01 KG/M2

## 2025-06-24 DIAGNOSIS — Z34.90 PREGNANCY, UNSPECIFIED GESTATIONAL AGE: Primary | ICD-10-CM

## 2025-06-24 DIAGNOSIS — Z34.90 PREGNANCY, UNSPECIFIED GESTATIONAL AGE: ICD-10-CM

## 2025-06-24 LAB — GBS, EXTERNAL RESULT: NEGATIVE

## 2025-06-24 PROCEDURE — 0502F SUBSEQUENT PRENATAL CARE: CPT | Performed by: OBSTETRICS & GYNECOLOGY

## 2025-06-24 NOTE — PROGRESS NOTES
Karissa GNiki Kathleen is 36w3d   Doing well  +FM  Denies LOF, bleeding/spotting, cramping, headache, blurred vision, edema, or RUQ pain.     GBS today  Desires cervical exam

## 2025-06-24 NOTE — PROGRESS NOTES
Patient here for return OB visit at 36w3d.       She reports good fetal movement.   She denies vaginal bleeding, loss of fluid, abnormal vaginal discharge, UTI symptoms, cramping, or contractions.       /80 (BP Site: Right Upper Arm, Patient Position: Sitting)   Pulse 83   Temp 98 °F (36.7 °C) (Oral)   Resp 16   Ht 1.829 m (6')   Wt 87.1 kg (192 lb)   LMP 09/15/2024 (Exact Date)   SpO2 97%   BMI 26.04 kg/m²      Patient Active Problem List    Diagnosis Date Noted    Pregnancy 2025     Primary Provider: Efe     EDC by: by 1st TM US not consistent with LMP  Social:  Mode   IOB labs: Hep B neg.Hep C neg. RPR neg. HIV neg. Varicella/Rubella immune O POSITIVE  . Normal hemoglobin electrophoresis.    Genetic Screening:Panoroma low risk  Horizon    Anatomy: with MFM on 3/5/25  3rd TM labs: GTT NA Type I DM  Hgb___ Plts___  Vaccines:   Flu:__ TDAP: 25 RSV ___ 32-36 weeks September-January   Rhogam: O POSITIVE     GBS:    Pain management in labor :   -Epidural  -Lamaze   -Hydrotherapy        Postpartum  -Breast/Bottle   -Pap: 23 NILM      Problem List:  1) Type I DM  -A1C 6.0  -does not have a CGM  -follows with endocrinology at [facility] (Dr Betts)   -Lanus 26 units   - lispro ranges from 8-9 units per meal   -had eye exam at the beginning of pregnancy   -repeating A1c 4/15/25           Pregestational diabetes mellitus, modified White class B 01/15/2025       Return in about 1 week (around 2025).    Coretta Wilks MD

## 2025-06-26 ENCOUNTER — RESULTS FOLLOW-UP (OUTPATIENT)
Age: 30
End: 2025-06-26

## 2025-06-26 LAB
GP B STREP DNA SPEC QL NAA+PROBE: NEGATIVE
SPECIMEN SOURCE: NORMAL

## 2025-06-27 ENCOUNTER — TELEPHONE (OUTPATIENT)
Age: 30
End: 2025-06-27

## 2025-07-01 ENCOUNTER — ROUTINE PRENATAL (OUTPATIENT)
Age: 30
End: 2025-07-01

## 2025-07-01 VITALS — HEART RATE: 87 BPM | DIASTOLIC BLOOD PRESSURE: 72 MMHG | SYSTOLIC BLOOD PRESSURE: 112 MMHG

## 2025-07-01 VITALS
HEIGHT: 72 IN | DIASTOLIC BLOOD PRESSURE: 69 MMHG | TEMPERATURE: 98 F | OXYGEN SATURATION: 97 % | RESPIRATION RATE: 16 BRPM | BODY MASS INDEX: 25.95 KG/M2 | WEIGHT: 191.6 LBS | HEART RATE: 74 BPM | SYSTOLIC BLOOD PRESSURE: 129 MMHG

## 2025-07-01 DIAGNOSIS — Z34.90 PREGNANCY, UNSPECIFIED GESTATIONAL AGE: ICD-10-CM

## 2025-07-01 DIAGNOSIS — O24.319 PREGESTATIONAL DIABETES MELLITUS, MODIFIED WHITE CLASS B: Primary | ICD-10-CM

## 2025-07-01 DIAGNOSIS — Z34.90 PREGNANCY, UNSPECIFIED GESTATIONAL AGE: Primary | ICD-10-CM

## 2025-07-01 PROCEDURE — 0502F SUBSEQUENT PRENATAL CARE: CPT | Performed by: OBSTETRICS & GYNECOLOGY

## 2025-07-01 NOTE — PROGRESS NOTES
Chief Complaint   Patient presents with    Routine Prenatal Visit     Karissa GNiki Kathleen is 37w3d   Doing well  +FM  Denies LOF, bleeding/spotting, cramping, headache, blurred vision, edema, or RUQ pain.     Desires cervical exam today

## 2025-07-01 NOTE — PROGRESS NOTES
Patient here for return OB visit at 37w3d.       She reports good fetal movement.   She denies vaginal bleeding, loss of fluid, abnormal vaginal discharge, UTI symptoms, cramping, or contractions.       /69 (BP Site: Left Upper Arm, Patient Position: Sitting)   Pulse 74   Temp 98 °F (36.7 °C) (Oral)   Resp 16   Ht 1.829 m (6')   Wt 86.9 kg (191 lb 9.6 oz)   LMP 09/15/2024 (Exact Date)   SpO2 97%   BMI 25.99 kg/m²    Cervix:1 very posterior     Prenatal Fetal Information  Fetal HR: 125  Movement: Present      Patient Active Problem List    Diagnosis Date Noted    Pregnancy 2025     Primary Provider: Efe     EDC by: by 1st TM US not consistent with LMP  Social:  Mode   IOB labs: Hep B neg.Hep C neg. RPR neg. HIV neg. Varicella/Rubella immune O POSITIVE  . Normal hemoglobin electrophoresis.    Genetic Screening:Panoroma low risk  Horizon    Anatomy: with MFM on 3/5/25  3rd TM labs: GTT NA Type I DM  Hgb___ Plts___  Vaccines:   Flu:__ TDAP: 25 RSV ___ 32-36 weeks September-January   Rhogam: O POSITIVE     GBS:    Pain management in labor :   -Epidural  -Lamaze   -Hydrotherapy        Postpartum  -Breast/Bottle   -Pap: 23 NILM      Problem List:  1) Type I DM  -A1C 6.0  -does not have a CGM  -follows with endocrinology at [facility] (Dr Betts)   -Lanus 26 units   - lispro ranges from 8-9 units per meal   -had eye exam at the beginning of pregnancy   -repeating A1c 4/15/25           Pregestational diabetes mellitus, modified White class B 01/15/2025       Return in about 1 week (around 2025).    Coretta Wilks MD

## 2025-07-01 NOTE — PROGRESS NOTES
Patient was seen 7/1/2025      Please look under media to view full consult and ultrasound report in ViewPoint.         Sadaf Eagle MD   Maternal Fetal Medicine

## 2025-07-01 NOTE — PROGRESS NOTES
Provided verbal communication regarding Non-Stress Test (NST), fetal movement counts, labor precautions, and signs and symptoms of pre-eclampsia. Patient verbalized understanding of the information provided. All patient questions were answered.  NST on 7/1/25 at 0829 - 0850 21 minutes, , moderate variability, accelerations present, no decelerations, two contractions suspected via TOCO, patient reports only tightening, no pain.

## 2025-07-01 NOTE — PROCEDURES
PATIENT: EVA MALLORY.   -  : 1995   -  DOS:2025   -  INTERPRETING PROVIDER:Sadaf Eagle,   Indication  ========    Type 1 DM (on insulin)    Method  ======    Transabdominal ultrasound examination. View: Good view    Pregnancy  =========    Shetty pregnancy. Number of fetuses: 1    Dating  ======    Previous Ultrasound on: 2024  Type of prior assessment: GA  GA at prior assessment date 9 w + 5 d  GA by previous U/S 37 w + 3 d  JONG by previous Ultrasound: 2025  Ultrasound examination on: 2025  GA by U/S based upon: AC, BPD, Femur, HC  GA by U/S 37 w + 2 d  JONG by U/S: 2025  Assigned: based on ultrasound (GA), selected on 01/15/2025  Assigned GA 37 w + 3 d  Assigned JONG: 2025    Fetal Biometry  ============    Standard  BPD 90.1 mm 36w 3d 42% Hadlock  .7 mm -/- 93% Akiko  .8 mm 38w 0d 39% Hadlock  .0 mm 39w 2d 96% Hadlock  Femur 68.9 mm 35w 3d 8% Hadlock  EFW 3,353 g 38w 4d 72% Hadlock  EFW (lb) 7 lb  EFW (oz) 6 oz  EFW by: Hadlock (BPD-HC-AC-FL)  Extended   3.2 mm  Other Structures   bpm    General Evaluation  ==============    Cardiac activity present.  bpm. Fetal movements: visualized. Presentation: Cephalic  Placenta: Placental site: posterior, appropriate distance from the internal os  Umbilical cord: Cord vessels: 3 vessel cord  Amniotic fluid: Amount of AF: normal. MVP 6.5 cm. GREGORIO 17.0 cm. Q1 6.5 cm, Q2 6.0 cm, Q3 3.3 cm, Q4 1.2 cm    Fetal Anatomy  ===========    Stomach: normal  Kidneys: normal  Bladder: normal  Wants to know fetal sex: yes    Biophysical Profile  ==============    2: Fetal breathing movements  2: Gross body movements  2: Fetal tone  2: Amniotic fluid volume  NST: reactive  10/10 Biophysical profile score    Non Stress Test  =============    NST interpretation: reactive. Test duration 21 min. Baseline  bpm. Baseline variability: moderate. Accelerations: present. Decelerations: absent. Uterine 
02-Oct-2017 14:07

## 2025-07-05 ENCOUNTER — ANESTHESIA EVENT (OUTPATIENT)
Facility: HOSPITAL | Age: 30
End: 2025-07-05
Payer: COMMERCIAL

## 2025-07-05 ENCOUNTER — HOSPITAL ENCOUNTER (INPATIENT)
Facility: HOSPITAL | Age: 30
LOS: 2 days | Discharge: HOME | End: 2025-07-07
Attending: OBSTETRICS & GYNECOLOGY | Admitting: OBSTETRICS & GYNECOLOGY
Payer: COMMERCIAL

## 2025-07-05 ENCOUNTER — ANESTHESIA (OUTPATIENT)
Facility: HOSPITAL | Age: 30
End: 2025-07-05
Payer: COMMERCIAL

## 2025-07-05 PROBLEM — O47.9 UTERINE CONTRACTIONS: Status: ACTIVE | Noted: 2025-07-05

## 2025-07-05 LAB
ABO + RH BLD: NORMAL
BASOPHILS # BLD: 0.04 K/UL (ref 0–0.1)
BASOPHILS NFR BLD: 0.3 % (ref 0–1)
BLOOD GROUP ANTIBODIES SERPL: NORMAL
DIFFERENTIAL METHOD BLD: ABNORMAL
EOSINOPHIL # BLD: 0.1 K/UL (ref 0–0.4)
EOSINOPHIL NFR BLD: 0.8 % (ref 0–7)
ERYTHROCYTE [DISTWIDTH] IN BLOOD BY AUTOMATED COUNT: 13.2 % (ref 11.5–14.5)
GLUCOSE BLD STRIP.AUTO-MCNC: 111 MG/DL (ref 65–117)
GLUCOSE BLD STRIP.AUTO-MCNC: 116 MG/DL (ref 65–117)
GLUCOSE BLD STRIP.AUTO-MCNC: 132 MG/DL (ref 65–117)
GLUCOSE BLD STRIP.AUTO-MCNC: 86 MG/DL (ref 65–117)
HCT VFR BLD AUTO: 39.7 % (ref 35–47)
HGB BLD-MCNC: 13.1 G/DL (ref 11.5–16)
IMM GRANULOCYTES # BLD AUTO: 0.03 K/UL (ref 0–0.04)
IMM GRANULOCYTES NFR BLD AUTO: 0.2 % (ref 0–0.5)
LYMPHOCYTES # BLD: 1.43 K/UL (ref 0.8–3.5)
LYMPHOCYTES NFR BLD: 11.4 % (ref 12–49)
MCH RBC QN AUTO: 31 PG (ref 26–34)
MCHC RBC AUTO-ENTMCNC: 33 G/DL (ref 30–36.5)
MCV RBC AUTO: 93.9 FL (ref 80–99)
MONOCYTES # BLD: 0.93 K/UL (ref 0–1)
MONOCYTES NFR BLD: 7.4 % (ref 5–13)
NEUTS SEG # BLD: 10.06 K/UL (ref 1.8–8)
NEUTS SEG NFR BLD: 79.9 % (ref 32–75)
NRBC # BLD: 0 K/UL (ref 0–0.01)
NRBC BLD-RTO: 0 PER 100 WBC
PLATELET # BLD AUTO: 202 K/UL (ref 150–400)
PMV BLD AUTO: 12.3 FL (ref 8.9–12.9)
RBC # BLD AUTO: 4.23 M/UL (ref 3.8–5.2)
RPR SER QL: NONREACTIVE
SERVICE CMNT-IMP: ABNORMAL
SERVICE CMNT-IMP: NORMAL
SPECIMEN EXP DATE BLD: NORMAL
WBC # BLD AUTO: 12.6 K/UL (ref 3.6–11)

## 2025-07-05 PROCEDURE — 86901 BLOOD TYPING SEROLOGIC RH(D): CPT

## 2025-07-05 PROCEDURE — 00HU33Z INSERTION OF INFUSION DEVICE INTO SPINAL CANAL, PERCUTANEOUS APPROACH: ICD-10-PCS | Performed by: ANESTHESIOLOGY

## 2025-07-05 PROCEDURE — 2580000003 HC RX 258: Performed by: MIDWIFE

## 2025-07-05 PROCEDURE — 86592 SYPHILIS TEST NON-TREP QUAL: CPT

## 2025-07-05 PROCEDURE — 85025 COMPLETE CBC W/AUTO DIFF WBC: CPT

## 2025-07-05 PROCEDURE — 4A1HXCZ MONITORING OF PRODUCTS OF CONCEPTION, CARDIAC RATE, EXTERNAL APPROACH: ICD-10-PCS | Performed by: OBSTETRICS & GYNECOLOGY

## 2025-07-05 PROCEDURE — 6370000000 HC RX 637 (ALT 250 FOR IP): Performed by: MIDWIFE

## 2025-07-05 PROCEDURE — 4500000002 HC ER NO CHARGE

## 2025-07-05 PROCEDURE — 99283 EMERGENCY DEPT VISIT LOW MDM: CPT

## 2025-07-05 PROCEDURE — 1100000000 HC RM PRIVATE

## 2025-07-05 PROCEDURE — 3700000025 EPIDURAL BLOCK: Performed by: STUDENT IN AN ORGANIZED HEALTH CARE EDUCATION/TRAINING PROGRAM

## 2025-07-05 PROCEDURE — 7100000000 HC PACU RECOVERY - FIRST 15 MIN

## 2025-07-05 PROCEDURE — 2500000003 HC RX 250 WO HCPCS: Performed by: ANESTHESIOLOGY

## 2025-07-05 PROCEDURE — 6360000002 HC RX W HCPCS: Performed by: ANESTHESIOLOGY

## 2025-07-05 PROCEDURE — 7210000100 HC LABOR FEE PER 1 HR

## 2025-07-05 PROCEDURE — 7220000101 HC DELIVERY VAGINAL/SINGLE

## 2025-07-05 PROCEDURE — 86850 RBC ANTIBODY SCREEN: CPT

## 2025-07-05 PROCEDURE — 86900 BLOOD TYPING SEROLOGIC ABO: CPT

## 2025-07-05 PROCEDURE — 82962 GLUCOSE BLOOD TEST: CPT

## 2025-07-05 RX ORDER — SEVOFLURANE 250 ML/250ML
1 LIQUID RESPIRATORY (INHALATION) CONTINUOUS PRN
Status: DISCONTINUED | OUTPATIENT
Start: 2025-07-05 | End: 2025-07-06

## 2025-07-05 RX ORDER — LIDOCAINE HYDROCHLORIDE AND EPINEPHRINE 20; 5 MG/ML; UG/ML
INJECTION, SOLUTION EPIDURAL; INFILTRATION; INTRACAUDAL; PERINEURAL
Status: DISCONTINUED | OUTPATIENT
Start: 2025-07-05 | End: 2025-07-05 | Stop reason: SDUPTHER

## 2025-07-05 RX ORDER — TERBUTALINE SULFATE 1 MG/ML
0.25 INJECTION SUBCUTANEOUS
Status: DISCONTINUED | OUTPATIENT
Start: 2025-07-05 | End: 2025-07-06

## 2025-07-05 RX ORDER — SODIUM CHLORIDE 0.9 % (FLUSH) 0.9 %
5-40 SYRINGE (ML) INJECTION EVERY 12 HOURS SCHEDULED
Status: DISCONTINUED | OUTPATIENT
Start: 2025-07-05 | End: 2025-07-06

## 2025-07-05 RX ORDER — BUPIVACAINE HYDROCHLORIDE 2.5 MG/ML
INJECTION, SOLUTION EPIDURAL; INFILTRATION; INTRACAUDAL; PERINEURAL
Status: DISCONTINUED | OUTPATIENT
Start: 2025-07-05 | End: 2025-07-05 | Stop reason: SDUPTHER

## 2025-07-05 RX ORDER — FENTANYL/BUPIVACAINE/NS/PF 2-1250MCG
1-15 PLASTIC BAG, INJECTION (ML) INJECTION CONTINUOUS
Refills: 0 | Status: DISCONTINUED | OUTPATIENT
Start: 2025-07-05 | End: 2025-07-06

## 2025-07-05 RX ORDER — ONDANSETRON 2 MG/ML
4 INJECTION INTRAMUSCULAR; INTRAVENOUS EVERY 6 HOURS PRN
Status: DISCONTINUED | OUTPATIENT
Start: 2025-07-05 | End: 2025-07-06

## 2025-07-05 RX ORDER — NALBUPHINE HYDROCHLORIDE 10 MG/ML
5 INJECTION INTRAMUSCULAR; INTRAVENOUS; SUBCUTANEOUS EVERY 4 HOURS PRN
Status: DISCONTINUED | OUTPATIENT
Start: 2025-07-05 | End: 2025-07-06

## 2025-07-05 RX ORDER — SODIUM CHLORIDE, SODIUM LACTATE, POTASSIUM CHLORIDE, AND CALCIUM CHLORIDE .6; .31; .03; .02 G/100ML; G/100ML; G/100ML; G/100ML
500 INJECTION, SOLUTION INTRAVENOUS PRN
Status: DISCONTINUED | OUTPATIENT
Start: 2025-07-05 | End: 2025-07-06

## 2025-07-05 RX ORDER — BUPIVACAINE HYDROCHLORIDE 2.5 MG/ML
INJECTION, SOLUTION EPIDURAL; INFILTRATION; INTRACAUDAL; PERINEURAL
Status: COMPLETED
Start: 2025-07-05 | End: 2025-07-05

## 2025-07-05 RX ORDER — CARBOPROST TROMETHAMINE 250 UG/ML
250 INJECTION, SOLUTION INTRAMUSCULAR PRN
Status: DISCONTINUED | OUTPATIENT
Start: 2025-07-05 | End: 2025-07-06

## 2025-07-05 RX ORDER — INSULIN GLARGINE 100 [IU]/ML
12 INJECTION, SOLUTION SUBCUTANEOUS ONCE
Status: COMPLETED | OUTPATIENT
Start: 2025-07-05 | End: 2025-07-05

## 2025-07-05 RX ORDER — ACETAMINOPHEN 500 MG
1000 TABLET ORAL EVERY 8 HOURS PRN
Status: DISCONTINUED | OUTPATIENT
Start: 2025-07-05 | End: 2025-07-06

## 2025-07-05 RX ORDER — ONDANSETRON 4 MG/1
4 TABLET, ORALLY DISINTEGRATING ORAL EVERY 6 HOURS PRN
Status: DISCONTINUED | OUTPATIENT
Start: 2025-07-05 | End: 2025-07-06

## 2025-07-05 RX ORDER — LIDOCAINE HYDROCHLORIDE AND EPINEPHRINE 20; 5 MG/ML; UG/ML
INJECTION, SOLUTION EPIDURAL; INFILTRATION; INTRACAUDAL; PERINEURAL
Status: COMPLETED
Start: 2025-07-05 | End: 2025-07-05

## 2025-07-05 RX ORDER — MISOPROSTOL 200 UG/1
400 TABLET ORAL PRN
Status: DISCONTINUED | OUTPATIENT
Start: 2025-07-05 | End: 2025-07-06

## 2025-07-05 RX ORDER — FENTANYL CITRATE 50 UG/ML
INJECTION, SOLUTION INTRAMUSCULAR; INTRAVENOUS
Status: COMPLETED
Start: 2025-07-05 | End: 2025-07-05

## 2025-07-05 RX ORDER — SODIUM CHLORIDE 0.9 % (FLUSH) 0.9 %
5-40 SYRINGE (ML) INJECTION PRN
Status: DISCONTINUED | OUTPATIENT
Start: 2025-07-05 | End: 2025-07-06

## 2025-07-05 RX ORDER — LIDOCAINE HYDROCHLORIDE 10 MG/ML
30 INJECTION, SOLUTION EPIDURAL; INFILTRATION; INTRACAUDAL; PERINEURAL PRN
Status: DISCONTINUED | OUTPATIENT
Start: 2025-07-05 | End: 2025-07-06

## 2025-07-05 RX ORDER — FENTANYL CITRATE 50 UG/ML
INJECTION, SOLUTION INTRAMUSCULAR; INTRAVENOUS
Status: DISCONTINUED | OUTPATIENT
Start: 2025-07-05 | End: 2025-07-05 | Stop reason: SDUPTHER

## 2025-07-05 RX ORDER — HYDROMORPHONE HYDROCHLORIDE 1 MG/ML
2 INJECTION, SOLUTION INTRAMUSCULAR; INTRAVENOUS; SUBCUTANEOUS
Status: DISCONTINUED | OUTPATIENT
Start: 2025-07-05 | End: 2025-07-06

## 2025-07-05 RX ORDER — SODIUM CHLORIDE, SODIUM LACTATE, POTASSIUM CHLORIDE, CALCIUM CHLORIDE 600; 310; 30; 20 MG/100ML; MG/100ML; MG/100ML; MG/100ML
INJECTION, SOLUTION INTRAVENOUS CONTINUOUS
Status: DISCONTINUED | OUTPATIENT
Start: 2025-07-05 | End: 2025-07-06

## 2025-07-05 RX ORDER — METHYLERGONOVINE MALEATE 0.2 MG/ML
200 INJECTION INTRAVENOUS PRN
Status: DISCONTINUED | OUTPATIENT
Start: 2025-07-05 | End: 2025-07-06

## 2025-07-05 RX ORDER — EPHEDRINE SULFATE 50 MG/ML
10 INJECTION INTRAVENOUS
Status: DISCONTINUED | OUTPATIENT
Start: 2025-07-05 | End: 2025-07-06

## 2025-07-05 RX ORDER — SODIUM CHLORIDE 9 MG/ML
25 INJECTION, SOLUTION INTRAVENOUS PRN
Status: DISCONTINUED | OUTPATIENT
Start: 2025-07-05 | End: 2025-07-06

## 2025-07-05 RX ADMIN — BUPIVACAINE HYDROCHLORIDE 6 ML: 2.5 INJECTION, SOLUTION EPIDURAL; INFILTRATION; INTRACAUDAL; PERINEURAL at 17:19

## 2025-07-05 RX ADMIN — FENTANYL CITRATE 100 MCG: 50 INJECTION INTRAMUSCULAR; INTRAVENOUS at 17:19

## 2025-07-05 RX ADMIN — SODIUM CHLORIDE, SODIUM LACTATE, POTASSIUM CHLORIDE, AND CALCIUM CHLORIDE 500 ML: .6; .31; .03; .02 INJECTION, SOLUTION INTRAVENOUS at 16:33

## 2025-07-05 RX ADMIN — LIDOCAINE HYDROCHLORIDE,EPINEPHRINE BITARTRATE 3 ML: 20; .005 INJECTION, SOLUTION EPIDURAL; INFILTRATION; INTRACAUDAL; PERINEURAL at 17:14

## 2025-07-05 RX ADMIN — Medication 10 ML/HR: at 17:38

## 2025-07-05 RX ADMIN — INSULIN GLARGINE 12 UNITS: 100 INJECTION, SOLUTION SUBCUTANEOUS at 18:33

## 2025-07-05 NOTE — ANESTHESIA PROCEDURE NOTES
Epidural Block    Patient location during procedure: OB  Start time: 7/5/2025 5:06 PM  End time: 7/5/2025 5:19 PM  Reason for block: labor epidural  Staffing  Performed: anesthesiologist   Anesthesiologist: Lion Hinton MD  Performed by: Lion Hinton MD  Authorized by: Lion Hinton MD    Epidural  Patient position: sitting  Prep: DuraPrep  Patient monitoring: cardiac monitor, continuous pulse ox and frequent blood pressure checks  Approach: midline  Location: L3-4  Injection technique: ARCHANA saline  Provider prep: mask and sterile gloves  Needle  Needle type: Tuohy   Needle gauge: 17 G  Needle length: 3.5 in  Needle insertion depth: 4 cm  Catheter type: end hole  Catheter size: 18 G  Catheter at skin depth: 8 cm  Test dose: negativeCatheter Secured: tegaderm and tape  Assessment  Sensory level: T8  Hemodynamics: stable  Attempts: 1  Outcomes: uncomplicated and patient tolerated procedure well  Preanesthetic Checklist  Completed: patient identified, IV checked, site marked, risks and benefits discussed, surgical/procedural consents, equipment checked, pre-op evaluation, timeout performed, anesthesia consent given, oxygen available, monitors applied/VS acknowledged and fire risk safety assessment completed and verbalized

## 2025-07-05 NOTE — PROGRESS NOTES
1616: pt and  ambulatory to L&D 4 for admission & she states that she is ready for her epidural.  1650: SBAR at bedside to MONICA Mon RN

## 2025-07-05 NOTE — PROGRESS NOTES
1530 Pt presents to [facility] with c/o contractions since 0415 and SROM 1430. Coping well with contractions, reports good fetal movement, small amount of bloody mucus noted.

## 2025-07-05 NOTE — ANESTHESIA PRE PROCEDURE
Department of Anesthesiology  Preprocedure Note       Name:  Karissa Wang   Age:  30 y.o.  :  1995                                          MRN:  [id number]         Date:  2025      Surgeon: * No surgeons listed *    Procedure: * No procedures listed *    Medications prior to admission:   Prior to Admission medications    Medication Sig Start Date End Date Taking? Authorizing Provider   ferrous sulfate (FE TABS 325) 325 (65 Fe) MG EC tablet Take 1 tablet by mouth daily (with breakfast) 25  Yes Coretta Wilks MD   Omega-3 Fatty Acids (FISH OIL PO) Take by mouth   Yes Westley Garay MD   aspirin (ASPIRIN 81) 81 MG EC tablet Take 1 tablet by mouth daily Start at 12 weeks 24  Yes Coretta Wilks MD   Prenatal MV-Min-Fe Fum-FA-DHA (PRENATAL 1 PO) Take by mouth   Yes Westley Garay MD   insulin lispro (HUMALOG) 100 UNIT/ML SOLN injection vial Inject 9-11 Units into the skin as needed for High Blood Sugar As needed to adjust for glucose reading. 5/15/23  Yes Westley Garay MD   insulin glargine (LANTUS SOLOSTAR) 100 UNIT/ML injection pen Inject 36 Units into the skin nightly 3/6/23  Yes Westley Garay MD   blood glucose test strips (ASCENSIA AUTODISC VI;ONE TOUCH ULTRA TEST VI) strip  21  Yes Westley Garay MD       Current medications:    Current Facility-Administered Medications   Medication Dose Route Frequency Provider Last Rate Last Admin   • terbutaline (BRETHINE) injection 0.25 mg  0.25 mg SubCUTAneous Once PRN Adry Blount APRN - CNM       • lactated ringers infusion   IntraVENous Continuous Adry Blount APRN - CNM       • lactated ringers bolus 500 mL  500 mL IntraVENous PRN Adry Blount APRN - CNM        Or   • lactated ringers bolus 500 mL  500 mL IntraVENous PRN Adry Blount APRN -  mL/hr at 25 1633 500 mL at 25 1633   • sodium chloride flush 0.9 % injection 5-40 mL  5-40 mL IntraVENous 2 times per day

## 2025-07-05 NOTE — H&P
Labor and Delivery History & Physical  2025    Patient is a 30 y.o.  @ 38w0d with EDC of 2025 admitted from [facility] in active labor with SROM. She receives care from EVY Pagan) and denies issues with her pregnancy aside from Type I DM.     PNC: Blood type: O            RH: pos            Hep B: NEG            Rubella: immune            GBS status: NEG            HIV: NEG            RPR/TPA/VDRL: NR            GC/CT: NEG            HSV serology: not noted on prenatal records, but patient denies any hx/sxs    OBHX:   OB History          1    Para   0    Term   0       0    AB   0    Living   0         SAB   0    IAB   0    Ectopic   0    Molar   0    Multiple   0    Live Births   0                PMH:   Past Medical History:   Diagnosis Date    Type 1 diabetes mellitus (HCC)        PSH: History reviewed. No pertinent surgical history.    Medications:   Prior to Admission Medications   Prescriptions Last Dose Informant Patient Reported? Taking?   Omega-3 Fatty Acids (FISH OIL PO) 2025  Yes Yes   Sig: Take by mouth   Prenatal MV-Min-Fe Fum-FA-DHA (PRENATAL 1 PO) 2025  Yes Yes   Sig: Take by mouth   aspirin (ASPIRIN 81) 81 MG EC tablet 2025  No Yes   Sig: Take 1 tablet by mouth daily Start at 12 weeks   blood glucose test strips (ASCENSIA AUTODISC VI;ONE TOUCH ULTRA TEST VI) strip 2025  Yes Yes   ferrous sulfate (FE TABS 325) 325 (65 Fe) MG EC tablet 2025  No Yes   Sig: Take 1 tablet by mouth daily (with breakfast)   insulin glargine (LANTUS SOLOSTAR) 100 UNIT/ML injection pen 2025  Yes Yes   Sig: Inject 36 Units into the skin nightly   insulin lispro (HUMALOG) 100 UNIT/ML SOLN injection vial 2025  Yes Yes   Sig: Inject 9-11 Units into the skin as needed for High Blood Sugar As needed to adjust for glucose reading.      Facility-Administered Medications: None       Allergies: No Known Allergies    Pertinent ROS: Review of Systems - Negative except

## 2025-07-05 NOTE — ED NOTES
HPI Patient is a 30 y.o.  @ 38w0d with kenzie of 2025 who presents to the [facility] at 1528 reporting onset of contractions early this am and now a gush of fluid at 2pm. Today, she reports good fetal movement and denies vaginal bleeding, nausea/vomiting/diarrhea, fever, cough, or sore throat. She reports regular prenatal care with EVY Pagan) and denies any issues with this pregnancy. She is a Type I diabetic. Currently using Lantus 36U in evening and Humalog with meals. She has controlled sugars well this pregnancy. Eats a keto diet.        Chief Complaint   Patient presents with    Rupture of Membranes    Contractions       Past Medical History:   Diagnosis Date    Type 1 diabetes mellitus (HCC)        History reviewed. No pertinent surgical history.      Family History   Problem Relation Age of Onset    Breast Cancer Maternal Grandmother        Social History     Socioeconomic History    Marital status:      Spouse name: Not on file    Number of children: Not on file    Years of education: Not on file    Highest education level: Not on file   Occupational History    Not on file   Tobacco Use    Smoking status: Never    Smokeless tobacco: Never   Vaping Use    Vaping status: Never Used   Substance and Sexual Activity    Alcohol use: Yes     Comment: occasional    Drug use: Never    Sexual activity: Yes     Partners: Male     Birth control/protection: None   Other Topics Concern    Not on file   Social History Narrative    Not on file     Social Drivers of Health     Financial Resource Strain: Not on file   Food Insecurity: No Food Insecurity (2025)    Hunger Vital Sign     Worried About Running Out of Food in the Last Year: Never true     Ran Out of Food in the Last Year: Never true   Transportation Needs: No Transportation Needs (2025)    PRAPARE - Transportation     Lack of Transportation (Medical): No     Lack of Transportation (Non-Medical): No   Physical Activity: Not on file   Stress: Not

## 2025-07-05 NOTE — PROGRESS NOTES
1650 BSR received from LENNIE Concepcion RN. Pt is in bed getting prepared for an epidural. IVF bolus being administered. Pt is accompanied by her partner Mode.     1708 Dr. Hinton at bedside timeout preformed     1716 Test dose     1722 L Lateral    1741 R Lateral w/ peanut ball     1837 In throne. Pt sitting up while visitors are in the room.       1908 L Lateral with peanut ball     1935 BSR given to LENNIE Robles RN  Report included the following information Nurse Handoff Report, MAR, and Recent Results.

## 2025-07-06 LAB
GLUCOSE BLD STRIP.AUTO-MCNC: 121 MG/DL (ref 65–117)
GLUCOSE BLD STRIP.AUTO-MCNC: 153 MG/DL (ref 65–117)
SERVICE CMNT-IMP: ABNORMAL
SERVICE CMNT-IMP: ABNORMAL

## 2025-07-06 PROCEDURE — 82962 GLUCOSE BLOOD TEST: CPT

## 2025-07-06 PROCEDURE — 7220000101 HC DELIVERY VAGINAL/SINGLE

## 2025-07-06 PROCEDURE — APPNB30 APP NON BILLABLE TIME 0-30 MINS: Performed by: ADVANCED PRACTICE MIDWIFE

## 2025-07-06 PROCEDURE — 7210000100 HC LABOR FEE PER 1 HR

## 2025-07-06 PROCEDURE — 7100000001 HC PACU RECOVERY - ADDTL 15 MIN

## 2025-07-06 PROCEDURE — 1120000000 HC RM PRIVATE OB

## 2025-07-06 PROCEDURE — 6370000000 HC RX 637 (ALT 250 FOR IP): Performed by: MIDWIFE

## 2025-07-06 PROCEDURE — 7100000000 HC PACU RECOVERY - FIRST 15 MIN

## 2025-07-06 RX ORDER — DOCUSATE SODIUM 100 MG/1
100 CAPSULE, LIQUID FILLED ORAL 2 TIMES DAILY
Status: DISCONTINUED | OUTPATIENT
Start: 2025-07-06 | End: 2025-07-07 | Stop reason: HOSPADM

## 2025-07-06 RX ORDER — SODIUM CHLORIDE, SODIUM LACTATE, POTASSIUM CHLORIDE, CALCIUM CHLORIDE 600; 310; 30; 20 MG/100ML; MG/100ML; MG/100ML; MG/100ML
INJECTION, SOLUTION INTRAVENOUS CONTINUOUS
Status: DISCONTINUED | OUTPATIENT
Start: 2025-07-06 | End: 2025-07-07 | Stop reason: HOSPADM

## 2025-07-06 RX ORDER — IBUPROFEN 400 MG/1
800 TABLET, FILM COATED ORAL EVERY 8 HOURS SCHEDULED
Status: DISCONTINUED | OUTPATIENT
Start: 2025-07-06 | End: 2025-07-07 | Stop reason: HOSPADM

## 2025-07-06 RX ORDER — ONDANSETRON 2 MG/ML
4 INJECTION INTRAMUSCULAR; INTRAVENOUS EVERY 6 HOURS PRN
Status: DISCONTINUED | OUTPATIENT
Start: 2025-07-06 | End: 2025-07-07 | Stop reason: HOSPADM

## 2025-07-06 RX ORDER — ACETAMINOPHEN 500 MG
1000 TABLET ORAL EVERY 8 HOURS SCHEDULED
Status: DISCONTINUED | OUTPATIENT
Start: 2025-07-06 | End: 2025-07-07 | Stop reason: HOSPADM

## 2025-07-06 RX ORDER — SODIUM CHLORIDE 9 MG/ML
INJECTION, SOLUTION INTRAVENOUS PRN
Status: DISCONTINUED | OUTPATIENT
Start: 2025-07-06 | End: 2025-07-07 | Stop reason: HOSPADM

## 2025-07-06 RX ORDER — MODIFIED LANOLIN
OINTMENT (GRAM) TOPICAL PRN
Status: DISCONTINUED | OUTPATIENT
Start: 2025-07-06 | End: 2025-07-07 | Stop reason: HOSPADM

## 2025-07-06 RX ORDER — ONDANSETRON 4 MG/1
4 TABLET, ORALLY DISINTEGRATING ORAL EVERY 6 HOURS PRN
Status: DISCONTINUED | OUTPATIENT
Start: 2025-07-06 | End: 2025-07-07 | Stop reason: HOSPADM

## 2025-07-06 RX ORDER — SODIUM CHLORIDE 0.9 % (FLUSH) 0.9 %
5-40 SYRINGE (ML) INJECTION EVERY 12 HOURS SCHEDULED
Status: DISCONTINUED | OUTPATIENT
Start: 2025-07-06 | End: 2025-07-07 | Stop reason: HOSPADM

## 2025-07-06 RX ORDER — SODIUM CHLORIDE 0.9 % (FLUSH) 0.9 %
5-40 SYRINGE (ML) INJECTION PRN
Status: DISCONTINUED | OUTPATIENT
Start: 2025-07-06 | End: 2025-07-07 | Stop reason: HOSPADM

## 2025-07-06 RX ADMIN — IBUPROFEN 800 MG: 400 TABLET, FILM COATED ORAL at 01:35

## 2025-07-06 RX ADMIN — IBUPROFEN 800 MG: 400 TABLET, FILM COATED ORAL at 11:55

## 2025-07-06 RX ADMIN — ACETAMINOPHEN 1000 MG: 500 TABLET ORAL at 04:53

## 2025-07-06 RX ADMIN — DOCUSATE SODIUM 100 MG: 100 CAPSULE, LIQUID FILLED ORAL at 11:56

## 2025-07-06 RX ADMIN — ACETAMINOPHEN 1000 MG: 500 TABLET ORAL at 16:03

## 2025-07-06 RX ADMIN — IBUPROFEN 800 MG: 400 TABLET, FILM COATED ORAL at 19:52

## 2025-07-06 ASSESSMENT — PAIN SCALES - GENERAL
PAINLEVEL_OUTOF10: 0
PAINLEVEL_OUTOF10: 0

## 2025-07-06 NOTE — DISCHARGE INSTRUCTIONS
General activities  For vaginal deliveries, be up and moving around but remember to rest! Your body grew and birthed a small human! Be kind to yourself and allow your body to heal. You may gradually resume your normal activities as soon as you feel up to it. You may begin walking and strolling with the baby when you feel ready. Stay close to home the first few times in case you tire quickly. Do not push yourself. This is not about getting fit fast. This is allowing your body to have some movement which will aid in healing. Fresh air and sunshine are refreshing for both you and your baby.  Avoid heavy lifting, strenuous exercise, and excessive stair climbing.  If you delivered by  section, take your time. Give yourself some geremias! You should be up and moving multiple times per day, this will help you to feel better faster. However, be mindful and do not push yourself. If you have a day that you feel very uncomfortable, you likely did too much the day before. Rest and recognize your body is not ready for that level of activity yet. You will get there soon.  A good rule to follow is that you should not lift anything heavier than your baby in the care seat for the first 2 weeks. Moms with toddlers at home have children climb up to you on the couch to snuggle. If you do not have help at home post  and have multiple children to care for, please reach out to us.  Driving is individual. 7-14 days is a good rule of thumb. The first time you drive please have someone with you that can take over if you suddenly feel you cannot continue. Do not drive if you are taking narcotics for pain relief.  Shower as often as you like. You can even take a bath. An Epsom salt bath may help you feel better after delivery. For  deliveries, keep the water level below your incision. There should be nothing placed in the vagina until after your postpartum checkup. This means no tampons, douching, or intercourse (sex).

## 2025-07-06 NOTE — PROGRESS NOTES
Postpartum  note  Obstetrics Postpartum Progress Note  2025    Events  No Events  Early Discharge : No      Subjective  Pain: controlled with current medications  Lochia: Scant  PO's: taking regular diet, tolerating well  Voiding: voiding well  Ambulating: yes  Feeding: breast    Vitals  /65   Pulse 68   Temp 98.1 °F (36.7 °C) (Oral)   Resp 16   Ht 1.829 m (6')   Wt 87.1 kg (192 lb)   LMP 09/15/2024 (Exact Date)   SpO2 95%   Breastfeeding Unknown   BMI 26.04 kg/m²   Temp (24hrs), Av.2 °F (36.8 °C), Min:98 °F (36.7 °C), Max:98.6 °F (37 °C)      Physical Exam [unfilled]  General:alert and oriented times 3, patient without distress  Pulm: Lungs clear to Auscultation Bilat, unlabored breathing  Fundus: Firm, Midline at U/-1, appropriately tender.  Perineum:  Intact (25 0640 : Jacquelyn Jones, RN) (per RN documentation)  Breasts soft, NT  Extremities: Lower extremities are negative cords or tenderness  Edema: trace to 1+ bilateral pedal edema  Skin: warm dry          Labs:   Lab Results   Component Value Date/Time    WBC 12.6 2025 04:39 PM    HGB 13.1 2025 04:39 PM    HCT 39.7 2025 04:39 PM     2025 04:39 PM     Immunization History   Administered Date(s) Administered    COVID-19, MODERNA BLUE border, Primary or Immunocompromised, (age 12y+), IM, 100 mcg/0.5mL 2021, 04/15/2021    TDaP, ADACEL (age 10y-64y), BOOSTRIX (age 10y+), IM, 0.5mL 2025     Lab Results   Component Value Date    ABORH O POSITIVE 2025          Problem-based Assessment and Plan    Karissa Wang is a 30 y.o.  postpartum day Numbers 0-3: 1 @ s/p Vaginal, Spontaneous.    Principal Problem:    Uterine contractions  Resolved Problems:    * No resolved hospital problems. *    Routine Postpartum care: Meeting all goals  Circumcision Plans:  CIRCCONSENT: Circumcision NA  Hemodynamics: Stable   Pain: controlled with

## 2025-07-06 NOTE — PROGRESS NOTES
Doing well post partum  Last evening, plan was to half third trimester, evening Lantus dose and patient believed this would bottom her out due to her extended honeymoon phase. So, she chose to dose herself with 12U rather than halved dose recommendation  Fasting sugar this am was elevated at 153. Called by nursing with this. Will plan to cover herself with pre-pregnancy short acting Humalog with meals and monitor sugars post prandial.   Will allow rounding to provider to make recommendation for evening dose tonight or the recommended 14U

## 2025-07-06 NOTE — PROGRESS NOTES
Bedside and Verbal shift change report given to JEF Street RN (oncoming nurse) by RALF Jones RN (offgoing nurse). Report included the following information Nurse Handoff Report, Intake/Output, MAR, and Recent Results.

## 2025-07-06 NOTE — PROGRESS NOTES
1930: Report received from GABRIELA Mon. Pt resting on left side, feeling intermittent vaginal pressure.     2016: Straight cath 300mL. SVE 10/100/+2. Practice push; no descent. Reposition left lateral with peanut ball between feet. CNM updated.     2223: Start pushing. RN at bedside, continuously monitoring FHR tracing    2305: CNM called to bs for delivery    2310: Baby nurse at  for delivery    2318: Delivery of baby girl!     0140: TRANSFER - OUT REPORT:    Verbal report given to GABRIELA Jones on Karissa Wang  being transferred to  for routine progression of patient care       Report consisted of patient's Situation, Background, Assessment and   Recommendations(SBAR).     Information from the following report(s) Index, Intake/Output, MAR, and Recent Results was reviewed with the receiving nurse.    Opportunity for questions and clarification was provided.      Patient transported with:  Registered Nurse         Additional Notes: Patient declined RX therapy. Render Risk Assessment In Note?: yes Detail Level: Simple

## 2025-07-06 NOTE — LACTATION NOTE
This note was copied from a baby's chart.  Initial Lactation Consultation - baby born vaginally yesterday to a  mom at 38 weeks gestation. Mom has a history of type 1 diabetes. She noticed breast changes during her pregnancy and has been able to express drops of colostrum. Mom said she has been attempting to nurse but baby is struggling to maintain the latch. Mom was given a shield during the night.    On assessment baby was noted to be thrusting her tongue and pushing the nipple out of her mouth. Moms nipples are everted.    I assisted mom with a feeding this morning. Baby has a visible frenulum under her tongue. She is able to extend her tongue over her gums but she is tongue thrusting when she was sucking on my finger.     We were able to get baby latched directly to the breast and she would suck a few times and then push the nipple out with her tongue. I showed mom how to apply the shield and then baby was able to stay latched. With some sweetease she was sucking rhythmically for about 5 minutes and then fell asleep on the breast.     Mom will continue to work on getting baby latched deeply with or without the shield. She will attempt to feed at least 3 hours.     Baby's blood sugars have been 55, 65, and 57.    1050 - assisted mom with another feeding. Baby latching a little bit better but still pushing the nipple out. Baby was doing better maintaining the latch with the shield. We put the baby in the football hold and then we were able to take the shield of and she was maintaining the latch. On the second breast she latched directly to the breast without popping on and off. We used a small amount of sweetease on the first breast to keep her sucking.

## 2025-07-06 NOTE — L&D DELIVERY NOTE
Called to patient's room for delivery. She pushed for approximately an hour. Vertex delivered with spontaneous maternal pushing efforts over supported perineum. Loose nuchal reduced. Compound R arm presenting as well. Anterior arm delivered followed by posterior shoulder and body. Infant attempting to cry. Placed on maternal abdomen. Drying/tactile stimulation and bulb suction by RN staff. Delayed cord clamping. 3V cord double clamped and cut by CNM. Infant skin to skin with mother. Genet placenta delivered intact. Pitocin IV per protocol. Vulva, vagina and perineum inspected and found to be intact. Amanda care completed. Mom and baby doing well, left stable and attended.      Li Wang [[id number]]      Labor Events     Labor: No   Steroids: None  Cervical Ripening Date/Time:      Antibiotics Received during Labor: No  Rupture Identifier: Sac 1  Rupture Date/Time:  25 14:30:00   Rupture Type: SROM  Fluid Color: Clear  Fluid Volume: Large       Anesthesia    Method: Epidural       Labor Event Times      Labor onset date/time:        Dilation complete date/time:  25 20:20:00     Start pushing date/time:  2025 22:23:00   Decision date/time (emergent ):            Delivery Details      Delivery Date: 25 Delivery Time: 23:18:00                  Presentation    Presentation: Vertex       Cord                  Placenta           Lacerations           Blood Loss  Mother: Karissa Wang #[id number]     Start of Mother's Information      Delivery Blood Loss   Intrapartum & Postpartum: 25 111 - 25 2338    Delivery Admission: 25 111 - 25 2338         Intrapartum & Postpartum Delivery Admission    None                  End of Mother's Information  Mother: Karissa Wang #[id number]                Delivery Providers    Delivering clinician:      Provider Role     Obstetrician     Primary Nurse     Primary [place] Nurse     NICU Nurse

## 2025-07-07 VITALS
HEIGHT: 72 IN | DIASTOLIC BLOOD PRESSURE: 68 MMHG | TEMPERATURE: 98.2 F | SYSTOLIC BLOOD PRESSURE: 109 MMHG | WEIGHT: 192 LBS | OXYGEN SATURATION: 97 % | HEART RATE: 60 BPM | BODY MASS INDEX: 26.01 KG/M2 | RESPIRATION RATE: 16 BRPM

## 2025-07-07 LAB
GLUCOSE BLD STRIP.AUTO-MCNC: 113 MG/DL (ref 65–117)
GLUCOSE BLD STRIP.AUTO-MCNC: 116 MG/DL (ref 65–117)
SERVICE CMNT-IMP: NORMAL
SERVICE CMNT-IMP: NORMAL

## 2025-07-07 PROCEDURE — 6370000000 HC RX 637 (ALT 250 FOR IP): Performed by: MIDWIFE

## 2025-07-07 PROCEDURE — 82962 GLUCOSE BLOOD TEST: CPT

## 2025-07-07 PROCEDURE — 59400 OBSTETRICAL CARE: CPT | Performed by: OBSTETRICS & GYNECOLOGY

## 2025-07-07 RX ADMIN — ACETAMINOPHEN 1000 MG: 500 TABLET ORAL at 09:44

## 2025-07-07 RX ADMIN — DOCUSATE SODIUM 100 MG: 100 CAPSULE, LIQUID FILLED ORAL at 09:44

## 2025-07-07 RX ADMIN — IBUPROFEN 800 MG: 400 TABLET, FILM COATED ORAL at 04:35

## 2025-07-07 RX ADMIN — ACETAMINOPHEN 1000 MG: 500 TABLET ORAL at 00:10

## 2025-07-07 RX ADMIN — DOCUSATE SODIUM 100 MG: 100 CAPSULE, LIQUID FILLED ORAL at 00:09

## 2025-07-07 ASSESSMENT — PAIN DESCRIPTION - LOCATION
LOCATION: PERINEUM
LOCATION: ABDOMEN
LOCATION: ABDOMEN

## 2025-07-07 ASSESSMENT — PAIN SCALES - GENERAL
PAINLEVEL_OUTOF10: 1

## 2025-07-07 ASSESSMENT — PAIN - FUNCTIONAL ASSESSMENT
PAIN_FUNCTIONAL_ASSESSMENT: ACTIVITIES ARE NOT PREVENTED

## 2025-07-07 ASSESSMENT — PAIN DESCRIPTION - DESCRIPTORS
DESCRIPTORS: SORE
DESCRIPTORS: CRAMPING;SORE
DESCRIPTORS: CRAMPING

## 2025-07-07 ASSESSMENT — PAIN DESCRIPTION - ORIENTATION
ORIENTATION: LOWER
ORIENTATION: LOWER
ORIENTATION: MID;LOWER

## 2025-07-07 NOTE — PROGRESS NOTES
Bedside and Verbal shift change report given to YOAV Sales RN (oncoming nurse) by JEF Street RN (offgoing nurse). Report included the following information Nurse Handoff Report, Adult Overview, Intake/Output, MAR, and Recent Results.

## 2025-07-07 NOTE — LACTATION NOTE
This note was copied from a baby's chart.  Mom and baby scheduled for discharge today. Mom said the latching has been getting better and she has been getting baby latched without the shield. I watched mom latch the baby this morning in the football hold. Baby was able to get a good latch after several attempts. Baby was sucking rhythmically with swallows noted. I showed mom how to feed in the prone position and baby did well in that position also.    We reviewed cluster feeding, engorgement and pumping. Breast feeding teaching completed and all questions answered.

## 2025-07-07 NOTE — DISCHARGE SUMMARY
Obstetrical Discharge Summary     Name: Karissa Wang MRN: [id number]  SSN: [id number]    YOB: 1995  Age: 30 y.o.  Sex: female      Admit Date: 2025    Discharge Date: 2025     Admitting Physician: Chrissy Baker MD     Attending Physician:  Coretta Wilks MD     Admission Diagnoses: Uterine contractions [O47.9]    Discharge Diagnoses:   Information for the patient's :  Li Wang [[id number]]   @[id number]@     Additional Diagnoses:  No components found for: \"OBEXTABORH\", \"OBEXTABSCRN\", \"OBEXTRUBELLA\", \"OBEXTGRBS\"    Hospital Course: Normal hospital course following the delivery.    Disposition at Discharge: Home or self care    Discharged Condition: Stable    Patient Instructions:   Current Discharge Medication List        CONTINUE these medications which have NOT CHANGED    Details   ferrous sulfate (FE TABS 325) 325 (65 Fe) MG EC tablet Take 1 tablet by mouth daily (with breakfast)  Qty: 30 tablet, Refills: 11      Omega-3 Fatty Acids (FISH OIL PO) Take by mouth      aspirin (ASPIRIN 81) 81 MG EC tablet Take 1 tablet by mouth daily Start at 12 weeks  Qty: 30 tablet, Refills: 6      Prenatal MV-Min-Fe Fum-FA-DHA (PRENATAL 1 PO) Take by mouth      insulin lispro (HUMALOG) 100 UNIT/ML SOLN injection vial Inject 9-11 Units into the skin as needed for High Blood Sugar As needed to adjust for glucose reading.      insulin glargine (LANTUS SOLOSTAR) 100 UNIT/ML injection pen Inject 36 Units into the skin nightly      blood glucose test strips (ASCENSIA AUTODISC VI;ONE TOUCH ULTRA TEST VI) strip              Reference my discharge instructions.    No follow-ups on file.     Signed By:  Coretta Wilks MD     2025

## 2025-07-07 NOTE — PROGRESS NOTES
Postpartum Rounding Note    Karissa Wang 30 y.o.      PPD # 2    Subjective:   Patient doing well. Has no complaints.  Pain is well controlled with motrin and tylenol . Baby is at the bedside.  She is breast feeding. Patient is urinating adequately, ambulating, and on a regular diet.  Lochia appropriate. No nausea, vomiting, fever, chills, CP, SOB, calf pain.    Objective:   /76   Pulse 60   Temp 98.1 °F (36.7 °C) (Oral)   Resp 18   Ht 1.829 m (6')   Wt 87.1 kg (192 lb)   LMP 09/15/2024 (Exact Date)   SpO2 97%   Breastfeeding Unknown   BMI 26.04 kg/m²    Gen: NAD  Abd: soft, appropriately TTP, uterine fundus firm, below the umbilicus  Ext: nontender, no edema noted    Labs:   WBC   Date/Time Value Ref Range Status   2025 04:39 PM 12.6 (H) 3.6 - 11.0 K/uL Final   2025 10:47 AM 9.9 3.6 - 11.0 K/uL Final     Hemoglobin   Date/Time Value Ref Range Status   2025 04:39 PM 13.1 11.5 - 16.0 g/dL Final   2025 10:47 AM 11.9 11.5 - 16.0 g/dL Final     Hematocrit   Date/Time Value Ref Range Status   2025 04:39 PM 39.7 35.0 - 47.0 % Final   2025 10:47 AM 37.8 35.0 - 47.0 % Final     Platelets   Date/Time Value Ref Range Status   2025 04:39  150 - 400 K/uL Final   2025 10:47  150 - 400 K/uL Final      No intake or output data in the 24 hours ending 25 2429    Assessment:   Karissa Wang 30 y.o.    PPD # 2    Afebrile, VSS, tolerating pain with medication, regular diet, adequate urine output   O POSITIVE  Type I DM   Insulin regimen is now her prepregnancy regimen. Fairly well controlled since delivery       Plan:   Continue with current management   Discharge home today if no complications arise          -Will send patient home with rx: none, patient has motrin and tylenol at home         -Discussed post partum care and when to follow up   Rhogam not needed before discharge    Coretta Wilks MD

## 2025-08-14 ENCOUNTER — OFFICE VISIT (OUTPATIENT)
Age: 30
End: 2025-08-14

## 2025-08-14 VITALS
HEART RATE: 62 BPM | OXYGEN SATURATION: 97 % | BODY MASS INDEX: 22.92 KG/M2 | WEIGHT: 169 LBS | DIASTOLIC BLOOD PRESSURE: 71 MMHG | SYSTOLIC BLOOD PRESSURE: 102 MMHG

## 2025-08-14 DIAGNOSIS — F41.8 POSTPARTUM ANXIETY: Primary | ICD-10-CM

## 2025-08-21 PROBLEM — Z34.90 PREGNANCY: Status: RESOLVED | Noted: 2025-02-12 | Resolved: 2025-08-21

## 2025-08-26 ENCOUNTER — POSTPARTUM VISIT (OUTPATIENT)
Age: 30
End: 2025-08-26

## 2025-08-26 VITALS
WEIGHT: 170.8 LBS | SYSTOLIC BLOOD PRESSURE: 108 MMHG | BODY MASS INDEX: 23.13 KG/M2 | TEMPERATURE: 98.1 F | HEIGHT: 72 IN | DIASTOLIC BLOOD PRESSURE: 68 MMHG | HEART RATE: 62 BPM | OXYGEN SATURATION: 98 % | RESPIRATION RATE: 16 BRPM

## 2025-08-26 LAB
T4 FREE SERPL-MCNC: 0.9 NG/DL (ref 0.9–1.6)
TSH, 3RD GENERATION: 2 UIU/ML (ref 0.27–4.2)

## 2025-08-26 PROCEDURE — 0503F POSTPARTUM CARE VISIT: CPT | Performed by: OBSTETRICS & GYNECOLOGY

## 2025-08-26 ASSESSMENT — LIFESTYLE VARIABLES
HOW MANY STANDARD DRINKS CONTAINING ALCOHOL DO YOU HAVE ON A TYPICAL DAY: PATIENT DOES NOT DRINK
HOW OFTEN DO YOU HAVE A DRINK CONTAINING ALCOHOL: NEVER

## 2025-09-04 ENCOUNTER — PATIENT MESSAGE (OUTPATIENT)
Age: 30
End: 2025-09-04

## 2025-09-04 DIAGNOSIS — F41.8 POSTPARTUM ANXIETY: Primary | ICD-10-CM

## 2025-09-04 RX ORDER — SERTRALINE HYDROCHLORIDE 25 MG/1
25 TABLET, FILM COATED ORAL DAILY
Qty: 30 TABLET | Refills: 3 | Status: SHIPPED | OUTPATIENT
Start: 2025-09-04